# Patient Record
Sex: FEMALE | Race: WHITE | Employment: FULL TIME | URBAN - METROPOLITAN AREA
[De-identification: names, ages, dates, MRNs, and addresses within clinical notes are randomized per-mention and may not be internally consistent; named-entity substitution may affect disease eponyms.]

---

## 2023-02-20 ENCOUNTER — HOSPITAL ENCOUNTER (EMERGENCY)
Age: 53
Discharge: HOME | End: 2023-02-20
Attending: STUDENT IN AN ORGANIZED HEALTH CARE EDUCATION/TRAINING PROGRAM
Payer: COMMERCIAL

## 2023-02-20 VITALS
HEART RATE: 88 BPM | SYSTOLIC BLOOD PRESSURE: 175 MMHG | RESPIRATION RATE: 18 BRPM | OXYGEN SATURATION: 100 % | TEMPERATURE: 97.4 F | DIASTOLIC BLOOD PRESSURE: 94 MMHG

## 2023-02-20 DIAGNOSIS — T78.40XA ALLERGIC REACTION, INITIAL ENCOUNTER: Primary | ICD-10-CM

## 2023-02-20 PROCEDURE — A4216 STERILE WATER/SALINE, 10 ML: HCPCS | Performed by: STUDENT IN AN ORGANIZED HEALTH CARE EDUCATION/TRAINING PROGRAM

## 2023-02-20 PROCEDURE — 99284 EMERGENCY DEPT VISIT MOD MDM: CPT

## 2023-02-20 PROCEDURE — 96374 THER/PROPH/DIAG INJ IV PUSH: CPT

## 2023-02-20 PROCEDURE — 2500000003 HC RX 250 WO HCPCS: Performed by: STUDENT IN AN ORGANIZED HEALTH CARE EDUCATION/TRAINING PROGRAM

## 2023-02-20 PROCEDURE — 2580000003 HC RX 258: Performed by: STUDENT IN AN ORGANIZED HEALTH CARE EDUCATION/TRAINING PROGRAM

## 2023-02-20 PROCEDURE — 96375 TX/PRO/DX INJ NEW DRUG ADDON: CPT

## 2023-02-20 PROCEDURE — 96361 HYDRATE IV INFUSION ADD-ON: CPT

## 2023-02-20 PROCEDURE — 6360000002 HC RX W HCPCS: Performed by: STUDENT IN AN ORGANIZED HEALTH CARE EDUCATION/TRAINING PROGRAM

## 2023-02-20 RX ORDER — METHYLPREDNISOLONE SODIUM SUCCINATE 125 MG/2ML
125 INJECTION, POWDER, LYOPHILIZED, FOR SOLUTION INTRAMUSCULAR; INTRAVENOUS ONCE
Status: COMPLETED | OUTPATIENT
Start: 2023-02-20 | End: 2023-02-20

## 2023-02-20 RX ORDER — EPINEPHRINE 0.3 MG/.3ML
0.3 INJECTION SUBCUTANEOUS PRN
Qty: 0.3 ML | Refills: 0 | Status: SHIPPED | OUTPATIENT
Start: 2023-02-20

## 2023-02-20 RX ORDER — FAMOTIDINE 20 MG/1
20 TABLET, FILM COATED ORAL ONCE
Status: DISCONTINUED | OUTPATIENT
Start: 2023-02-20 | End: 2023-02-20

## 2023-02-20 RX ORDER — PREDNISONE 20 MG/1
20 TABLET ORAL 2 TIMES DAILY
Qty: 10 TABLET | Refills: 0 | Status: SHIPPED | OUTPATIENT
Start: 2023-02-20 | End: 2023-02-25

## 2023-02-20 RX ORDER — AZITHROMYCIN 250 MG/1
TABLET, FILM COATED ORAL
Qty: 1 PACKET | Refills: 0 | Status: SHIPPED | OUTPATIENT
Start: 2023-02-20 | End: 2023-02-24

## 2023-02-20 RX ORDER — FAMOTIDINE 20 MG/1
20 TABLET, FILM COATED ORAL DAILY
Qty: 14 TABLET | Refills: 0 | Status: SHIPPED | OUTPATIENT
Start: 2023-02-20 | End: 2023-03-06

## 2023-02-20 RX ORDER — DIPHENHYDRAMINE HYDROCHLORIDE 50 MG/ML
25 INJECTION INTRAMUSCULAR; INTRAVENOUS ONCE
Status: COMPLETED | OUTPATIENT
Start: 2023-02-20 | End: 2023-02-20

## 2023-02-20 RX ORDER — 0.9 % SODIUM CHLORIDE 0.9 %
1000 INTRAVENOUS SOLUTION INTRAVENOUS ONCE
Status: COMPLETED | OUTPATIENT
Start: 2023-02-20 | End: 2023-02-20

## 2023-02-20 RX ADMIN — METHYLPREDNISOLONE SODIUM SUCCINATE 125 MG: 125 INJECTION, POWDER, FOR SOLUTION INTRAMUSCULAR; INTRAVENOUS at 10:19

## 2023-02-20 RX ADMIN — SODIUM CHLORIDE 1000 ML: 9 INJECTION, SOLUTION INTRAVENOUS at 10:18

## 2023-02-20 RX ADMIN — FAMOTIDINE 20 MG: 10 INJECTION, SOLUTION INTRAVENOUS at 10:23

## 2023-02-20 RX ADMIN — DIPHENHYDRAMINE HYDROCHLORIDE 25 MG: 50 INJECTION, SOLUTION INTRAMUSCULAR; INTRAVENOUS at 10:19

## 2023-02-22 NOTE — ED PROVIDER NOTES
Jody Pang is a 80-year-old female presented to emergency department with allergic reaction. Patient had URI symptoms for several days and she took a Bactrim that she had leftover from a previous prescription. Patient has taken Bactrim once previously and denies having any allergic reaction. Patient began to have hives and diffuse pruritus that started about an hour after taking medication. Patient did take about 50 mg of Benadryl around 9 AM and presented to emergency department for evaluation. Patient denies fever, chills, chest pain, shortness of breath. The history is provided by the patient and medical records. Review of Systems   Constitutional:  Negative for chills, diaphoresis, fatigue and fever. Eyes:  Negative for photophobia and visual disturbance. Respiratory:  Negative for cough, chest tightness and shortness of breath. Cardiovascular:  Negative for chest pain, palpitations and leg swelling. Gastrointestinal:  Negative for abdominal distention, abdominal pain, diarrhea, nausea and vomiting. Genitourinary:  Negative for dysuria. Musculoskeletal:  Negative for neck pain and neck stiffness. Skin:  Positive for rash. Negative for pallor. Neurological:  Negative for headaches. Psychiatric/Behavioral:  Negative for confusion. Physical Exam  Vitals and nursing note reviewed. Constitutional:       General: She is not in acute distress. Appearance: Normal appearance. She is not ill-appearing. HENT:      Head: Normocephalic and atraumatic. Eyes:      General: No scleral icterus. Conjunctiva/sclera: Conjunctivae normal.      Pupils: Pupils are equal, round, and reactive to light. Cardiovascular:      Rate and Rhythm: Normal rate and regular rhythm. Pulmonary:      Effort: Pulmonary effort is normal.      Breath sounds: Normal breath sounds. Abdominal:      General: Bowel sounds are normal. There is no distension. Palpations: Abdomen is soft. Tenderness: There is no abdominal tenderness. There is no guarding or rebound. Musculoskeletal:      Cervical back: Normal range of motion and neck supple. No rigidity. No muscular tenderness. Right lower leg: No edema. Left lower leg: No edema. Skin:     General: Skin is warm and dry. Capillary Refill: Capillary refill takes less than 2 seconds. Coloration: Skin is not pale. Findings: Rash present. No erythema. Neurological:      Mental Status: She is alert and oriented to person, place, and time. Psychiatric:         Mood and Affect: Mood normal.        Procedures     MDM  Number of Diagnoses or Management Options  Allergic reaction, initial encounter  Diagnosis management comments: Lamont Chi is a 54-year-old female presented to emergency department with concern for allergic reaction. Patient developed symptoms of hives and pruritus following taking a Bactrim. Patient did use a topical facial cream also this morning but stated that she has used that previously. Patient did have hives that were diffuse covering her face back chest and arms. Did jeovany. Patient initially tachycardic  She was given IV fluids, IV Benadryl, IV Pepcid, IV solumedrol. Patient did not appear to have airway or GI involvement  Was observed in emergency department and home with resolution of symptoms. Patient was well-appearing at repeat evaluation not in acute distress  Patient was discharged home with steroids, Benadryl, EpiPen. Patient was advised to take Benadryl scheduled for the first 24 hours  She was advised return ED if she has any worsening of symptoms patient was advised on when to use EpiPen if needed. Patient was advised to avoid Bactrim. Patient has had prolonged symptoms of URI symptoms patient does not wish for repeat COVID or flu test patient did take a home COVID test which was negative patient will be given a prescription for azithromycin.   With patient's symptoms resolving patient does not likely need further evaluation emergency department patient's tachycardia resolved after IV fluids and medication patient is well-appearing at time of reevaluation she is agreeable to plan and given follow-up information  Lab work was considered but not ordered as patient appeared to have symptoms due to allergic reaction and had resolution of symptoms did not require any further evaluation while in ED, was not having any chest pain or shortness of breath    Differential diagnosis includes but is not limited to, allergic reaction, contact dermatitis, atopic dermatitis, cellulitis, autoimmune reaction              --------------------------------------------- PAST HISTORY ---------------------------------------------  Past Medical History:  has no past medical history on file. Past Surgical History:  has no past surgical history on file. Social History:      Family History: family history is not on file. The patients home medications have been reviewed. Allergies: Bactrim [sulfamethoxazole-trimethoprim]    -------------------------------------------------- RESULTS -------------------------------------------------  Labs:  No results found for this visit on 02/20/23. Radiology:  No orders to display       ------------------------- NURSING NOTES AND VITALS REVIEWED ---------------------------  Date / Time Roomed:  2/20/2023  9:53 AM  ED Bed Assignment:  13/13    The nursing notes within the ED encounter and vital signs as below have been reviewed. BP (!) 175/94   Pulse 88   Temp 97.4 °F (36.3 °C) (Infrared)   Resp 18   SpO2 100%   Oxygen Saturation Interpretation: Normal      ------------------------------------------ PROGRESS NOTES ------------------------------------------  7:10 AM EST  I have spoken with the patient and discussed todays results, in addition to providing specific details for the plan of care and counseling regarding the diagnosis and prognosis.   Their questions are answered at this time and they are agreeable with the plan. I discussed at length with them reasons for immediate return here for re evaluation. They will followup with their primary care physician by calling their office tomorrow. --------------------------------- ADDITIONAL PROVIDER NOTES ---------------------------------  At this time the patient is without objective evidence of an acute process requiring hospitalization or inpatient management. They have remained hemodynamically stable throughout their entire ED visit and are stable for discharge with outpatient follow-up. The plan has been discussed in detail and they are aware of the specific conditions for emergent return, as well as the importance of follow-up. Discharge Medication List as of 2/20/2023 12:59 PM        START taking these medications    Details   predniSONE (DELTASONE) 20 MG tablet Take 1 tablet by mouth 2 times daily for 5 days, Disp-10 tablet, R-0Print      EPINEPHrine (EPIPEN 2-ASHLEY) 0.3 MG/0.3ML SOAJ injection Inject 0.3 mLs into the muscle as needed (anaphylaxis, difficulty breathing) Use as directed for allergic reaction, Disp-0.3 mL, R-0Print      famotidine (PEPCID) 20 MG tablet Take 1 tablet by mouth daily for 14 days, Disp-14 tablet, R-0Print      azithromycin (ZITHROMAX Z-ASHLEY) 250 MG tablet Take 2 tablets (500 mg) on Day 1, and then take 1 tablet (250 mg) on days 2 through 5., Disp-1 packet, R-0Print             Diagnosis:  1. Allergic reaction, initial encounter        Disposition:  Patient's disposition: Discharge to home  Patient's condition is stable.              Paulo Chicas MD  02/23/23 [id number]

## 2023-02-23 ASSESSMENT — ENCOUNTER SYMPTOMS
VOMITING: 0
NAUSEA: 0
ABDOMINAL PAIN: 0
SHORTNESS OF BREATH: 0
DIARRHEA: 0
COUGH: 0
ABDOMINAL DISTENTION: 0
CHEST TIGHTNESS: 0
PHOTOPHOBIA: 0

## 2023-05-09 LAB — TISSUE TRANSGLUTAMINASE, IGA: 26 U/ML (ref 0–14)

## 2023-05-17 LAB
CALPROTECTIN, STOOL: 49 UG/G
PANCREATIC ELASTASE, FECAL: 630 UG/G

## 2023-05-24 ENCOUNTER — OFFICE VISIT (OUTPATIENT)
Dept: PRIMARY CARE | Facility: CLINIC | Age: 53
End: 2023-05-24
Payer: COMMERCIAL

## 2023-05-24 VITALS
SYSTOLIC BLOOD PRESSURE: 134 MMHG | OXYGEN SATURATION: 95 % | DIASTOLIC BLOOD PRESSURE: 80 MMHG | WEIGHT: 176 LBS | RESPIRATION RATE: 16 BRPM | HEART RATE: 70 BPM | BODY MASS INDEX: 31.18 KG/M2 | TEMPERATURE: 98.7 F

## 2023-05-24 DIAGNOSIS — Z11.59 NEED FOR HEPATITIS C SCREENING TEST: ICD-10-CM

## 2023-05-24 DIAGNOSIS — R13.10 DYSPHAGIA, UNSPECIFIED TYPE: ICD-10-CM

## 2023-05-24 DIAGNOSIS — Z11.4 ENCOUNTER FOR SCREENING FOR HIV: ICD-10-CM

## 2023-05-24 DIAGNOSIS — E03.9 ADULT HYPOTHYROIDISM: ICD-10-CM

## 2023-05-24 DIAGNOSIS — I10 ESSENTIAL HYPERTENSION: ICD-10-CM

## 2023-05-24 DIAGNOSIS — F17.211 CIGARETTE NICOTINE DEPENDENCE IN REMISSION: ICD-10-CM

## 2023-05-24 DIAGNOSIS — R89.4 ABNORMAL CELIAC ANTIBODY PANEL: ICD-10-CM

## 2023-05-24 DIAGNOSIS — E66.09 CLASS 1 OBESITY DUE TO EXCESS CALORIES WITHOUT SERIOUS COMORBIDITY WITH BODY MASS INDEX (BMI) OF 31.0 TO 31.9 IN ADULT: ICD-10-CM

## 2023-05-24 DIAGNOSIS — R19.7 DIARRHEA, UNSPECIFIED TYPE: ICD-10-CM

## 2023-05-24 DIAGNOSIS — K21.9 GASTROESOPHAGEAL REFLUX DISEASE WITHOUT ESOPHAGITIS: Primary | ICD-10-CM

## 2023-05-24 DIAGNOSIS — J30.2 SEASONAL ALLERGIC RHINITIS, UNSPECIFIED TRIGGER: ICD-10-CM

## 2023-05-24 DIAGNOSIS — R91.1 LUNG NODULE: ICD-10-CM

## 2023-05-24 DIAGNOSIS — Z13.220 LIPID SCREENING: ICD-10-CM

## 2023-05-24 PROBLEM — I34.1 MVP (MITRAL VALVE PROLAPSE): Status: ACTIVE | Noted: 2023-05-24

## 2023-05-24 PROBLEM — R92.8 ABNORMAL MAMMOGRAM: Status: RESOLVED | Noted: 2023-05-24 | Resolved: 2023-05-24

## 2023-05-24 PROBLEM — F41.1 GENERALIZED ANXIETY DISORDER: Status: ACTIVE | Noted: 2023-05-24

## 2023-05-24 PROBLEM — N39.3 URINARY, INCONTINENCE, STRESS FEMALE: Status: ACTIVE | Noted: 2023-05-24

## 2023-05-24 PROBLEM — Z78.9 CAFFEINE USE: Status: ACTIVE | Noted: 2023-05-24

## 2023-05-24 PROBLEM — Z88.9 MULTIPLE DRUG ALLERGIES: Status: ACTIVE | Noted: 2023-05-24

## 2023-05-24 PROBLEM — R35.0 URINARY FREQUENCY: Status: RESOLVED | Noted: 2023-05-24 | Resolved: 2023-05-24

## 2023-05-24 PROBLEM — A08.0 ROTAVIRUS INFECTION: Status: ACTIVE | Noted: 2023-05-24

## 2023-05-24 PROBLEM — E66.811 CLASS 1 OBESITY DUE TO EXCESS CALORIES WITHOUT SERIOUS COMORBIDITY WITH BODY MASS INDEX (BMI) OF 31.0 TO 31.9 IN ADULT: Status: ACTIVE | Noted: 2023-05-24

## 2023-05-24 PROCEDURE — 99203 OFFICE O/P NEW LOW 30 MIN: CPT | Performed by: FAMILY MEDICINE

## 2023-05-24 PROCEDURE — 3008F BODY MASS INDEX DOCD: CPT | Performed by: FAMILY MEDICINE

## 2023-05-24 PROCEDURE — 3075F SYST BP GE 130 - 139MM HG: CPT | Performed by: FAMILY MEDICINE

## 2023-05-24 PROCEDURE — 1036F TOBACCO NON-USER: CPT | Performed by: FAMILY MEDICINE

## 2023-05-24 PROCEDURE — 3079F DIAST BP 80-89 MM HG: CPT | Performed by: FAMILY MEDICINE

## 2023-05-24 RX ORDER — FEXOFENADINE HCL AND PSEUDOEPHEDRINE HCI 60; 120 MG/1; MG/1
1 TABLET, EXTENDED RELEASE ORAL 2 TIMES DAILY
COMMUNITY
End: 2023-05-24 | Stop reason: DRUGHIGH

## 2023-05-24 RX ORDER — FEXOFENADINE HCL AND PSEUDOEPHEDRINE HCI 60; 120 MG/1; MG/1
1 TABLET, EXTENDED RELEASE ORAL DAILY
Status: SHIPPED
Start: 2023-05-24 | End: 2023-09-05 | Stop reason: SDUPTHER

## 2023-05-24 RX ORDER — LEVOTHYROXINE SODIUM 100 UG/1
100 TABLET ORAL DAILY
COMMUNITY
Start: 2020-04-02 | End: 2023-05-24 | Stop reason: SDUPTHER

## 2023-05-24 RX ORDER — LEVOTHYROXINE SODIUM 100 UG/1
100 TABLET ORAL DAILY
Qty: 90 TABLET | Refills: 1
Start: 2023-05-24 | End: 2023-11-20

## 2023-05-24 RX ORDER — OMEPRAZOLE 40 MG/1
CAPSULE, DELAYED RELEASE ORAL
COMMUNITY
Start: 2021-10-11 | End: 2023-05-24 | Stop reason: SDUPTHER

## 2023-05-24 RX ORDER — OMEPRAZOLE 40 MG/1
40 CAPSULE, DELAYED RELEASE ORAL
Qty: 90 CAPSULE | Refills: 1 | Status: SHIPPED | OUTPATIENT
Start: 2023-05-24 | End: 2023-06-19 | Stop reason: SDUPTHER

## 2023-05-24 RX ORDER — EPINEPHRINE 0.3 MG/.3ML
0.3 INJECTION SUBCUTANEOUS
COMMUNITY
Start: 2023-02-20

## 2023-05-24 ASSESSMENT — ENCOUNTER SYMPTOMS
PALPITATIONS: 0
ADENOPATHY: 0
VOMITING: 0
DIAPHORESIS: 0
NUMBNESS: 0
LIGHT-HEADEDNESS: 0
CHILLS: 0
FREQUENCY: 0
POLYDIPSIA: 0
NAUSEA: 0
SORE THROAT: 0
ABDOMINAL PAIN: 1
DYSPHORIC MOOD: 0
WHEEZING: 0
CONSTIPATION: 0
SINUS PAIN: 0
DYSURIA: 0
COUGH: 0
CONFUSION: 0
DIZZINESS: 0
CHEST TIGHTNESS: 0
HEMATURIA: 0
SHORTNESS OF BREATH: 0
HEADACHES: 0
SINUS PRESSURE: 0
DIARRHEA: 1
UNEXPECTED WEIGHT CHANGE: 0
POLYPHAGIA: 0
NERVOUS/ANXIOUS: 0
FEVER: 0

## 2023-05-24 ASSESSMENT — PATIENT HEALTH QUESTIONNAIRE - PHQ9
SUM OF ALL RESPONSES TO PHQ9 QUESTIONS 1 AND 2: 0
1. LITTLE INTEREST OR PLEASURE IN DOING THINGS: NOT AT ALL
2. FEELING DOWN, DEPRESSED OR HOPELESS: NOT AT ALL

## 2023-05-24 NOTE — ASSESSMENT & PLAN NOTE
-  4 mm ground glass nodule in right lung  - per Fleischner  criteria, no routine follow up needed  - will check LDCT in 9/2023 as she does have smoking history

## 2023-05-24 NOTE — PROGRESS NOTES
Subjective   Patient ID: Kalie Galdamez is a 52 y.o. female who presents for new to estab (Sees GI will have a scope and colonoscopy, gets bronchitis a lot, still sounds very congested, stomach issues started in March, ended up in ER, positive for rotavirus, had another spell last week, siliac is high).    52 y.o. female here to establish care. Past medical history, past surgical history, medications, allergies, family history, and social history reviewed with patient and updated in chart.     Has been having a lot of GI troubles since the beginning of this year --> epigastric pain, diarreha. Did have rotavirus but these symptoms have been going on for a few months. Is seeing GI. Had blood work with positive celiac antibodies. Will be having EGD and colonoscopy.     She has chronic allergy symptoms. Uses Allegra-D and that does help. Has been on plain allegra and does not really help with her sinus congestion.          Review of Systems   Constitutional:  Negative for chills, diaphoresis, fever and unexpected weight change.   HENT:  Negative for congestion, sinus pressure, sinus pain, sneezing and sore throat.    Respiratory:  Negative for cough, chest tightness, shortness of breath and wheezing.    Cardiovascular:  Negative for chest pain, palpitations and leg swelling.   Gastrointestinal:  Positive for abdominal pain and diarrhea. Negative for constipation, nausea and vomiting.   Endocrine: Negative for cold intolerance, heat intolerance, polydipsia, polyphagia and polyuria.   Genitourinary:  Negative for dysuria, frequency, hematuria and urgency.   Neurological:  Negative for dizziness, syncope, light-headedness, numbness and headaches.   Hematological:  Negative for adenopathy.   Psychiatric/Behavioral:  Negative for confusion and dysphoric mood. The patient is not nervous/anxious.        Objective   /80   Pulse 70   Temp 37.1 °C (98.7 °F)   Resp 16   Wt 79.8 kg (176 lb)   SpO2 95%   BMI 31.18  kg/m²     Physical Exam  Vitals and nursing note reviewed.   Constitutional:       General: She is not in acute distress.     Appearance: Normal appearance.   HENT:      Head: Normocephalic and atraumatic.      Nose: Nose normal.   Eyes:      Extraocular Movements: Extraocular movements intact.      Conjunctiva/sclera: Conjunctivae normal.      Pupils: Pupils are equal, round, and reactive to light.   Cardiovascular:      Rate and Rhythm: Normal rate and regular rhythm.      Heart sounds: No murmur heard.     No friction rub. No gallop.   Pulmonary:      Effort: Pulmonary effort is normal.      Breath sounds: Normal breath sounds. No wheezing, rhonchi or rales.   Abdominal:      General: Bowel sounds are normal. There is no distension.      Palpations: Abdomen is soft.      Tenderness: There is no abdominal tenderness.   Musculoskeletal:         General: Normal range of motion.      Cervical back: Normal range of motion and neck supple.   Skin:     General: Skin is warm and dry.   Neurological:      General: No focal deficit present.      Mental Status: She is alert and oriented to person, place, and time.      Deep Tendon Reflexes: Reflexes normal.   Psychiatric:         Mood and Affect: Mood normal.         Behavior: Behavior normal.         Thought Content: Thought content normal.         Judgment: Judgment normal.         Assessment/Plan   Problem List Items Addressed This Visit       Abnormal celiac antibody panel     - following with GI  - will be going for EGD and colonoscopy         Relevant Orders    CBC and Auto Differential    Comprehensive Metabolic Panel    Adult hypothyroidism     - continue synthroid  - check labs           Relevant Medications    levothyroxine (Synthroid) 100 mcg tablet    Other Relevant Orders    CBC and Auto Differential    Comprehensive Metabolic Panel    TSH with reflex to Free T4 if abnormal    Allergic rhinitis, seasonal     - currently on allegra-D         Relevant Orders     CBC and Auto Differential    Comprehensive Metabolic Panel    BMI 31.0-31.9,adult     - Encouraged healthy lifestyle, including adequate exercise and high fiber, low fat and low carb diet.          Relevant Orders    CBC and Auto Differential    Comprehensive Metabolic Panel    Cigarette nicotine dependence in remission     - check LDCT in 9/2023         Relevant Orders    CBC and Auto Differential    Comprehensive Metabolic Panel    CT lung screening low dose    Class 1 obesity due to excess calories without serious comorbidity with body mass index (BMI) of 31.0 to 31.9 in adult     - Encouraged healthy lifestyle, including adequate exercise and high fiber, low fat and low carb diet.          Relevant Orders    CBC and Auto Differential    Comprehensive Metabolic Panel    Diarrhea     - following with GI. Will be going for EGD and colonoscopy         Relevant Orders    CBC and Auto Differential    Comprehensive Metabolic Panel    Dysphagia     - following with GI  - will be going for EGD         Relevant Orders    CBC and Auto Differential    Comprehensive Metabolic Panel    Essential hypertension     - not on medication   - blood pressure on the high side initially but improved on recheck  - is on allegra-D, which may be contributing  - will have her monitor blood pressure at home and call with results in 2 weeks. If blood pressure remains high, will need to try off allegra-D         Relevant Orders    CBC and Auto Differential    Comprehensive Metabolic Panel    Gastroesophageal reflux disease without esophagitis - Primary     - controlled on omeprazole          Relevant Medications    omeprazole (PriLOSEC) 40 mg DR capsule    Other Relevant Orders    CBC and Auto Differential    Comprehensive Metabolic Panel    Lung nodule     -  4 mm ground glass nodule in right lung  - per Fleischner  criteria, no routine follow up needed  - will check LDCT in 9/2023 as she does have smoking history          Relevant Orders     CBC and Auto Differential    Comprehensive Metabolic Panel     Other Visit Diagnoses       Need for hepatitis C screening test        Relevant Orders    Hepatitis C Antibody    Encounter for screening for HIV        Relevant Orders    HIV 1/2 Antigen/Antibody Screen with Reflex to Confirmation    Lipid screening        Relevant Orders    Lipid Panel

## 2023-05-24 NOTE — PATIENT INSTRUCTIONS
Kalie Galdamez ,    Thank you for coming in today. We at [facility] appreciate your trust in our care. If you have any questions or concerns about the care you received today, please do not hesitate to contact us at [phone].    The following instructions were discussed today:    - for now, stay on allegra-D  - monitor blood pressure at home once daily. As long as blood pressure stays under 140/90, okay to stay on allegra-D  - if blood pressure is consistently over 140/90, you should stop the allegra-D and try plain allegra   - get labs  - For blood work: Nothing to eat or drink for at least 10 hours prior. Okay for water or black coffee.   - Follow up in 3 months.

## 2023-05-24 NOTE — ASSESSMENT & PLAN NOTE
- not on medication   - blood pressure on the high side initially but improved on recheck  - is on allegra-D, which may be contributing  - will have her monitor blood pressure at home and call with results in 2 weeks. If blood pressure remains high, will need to try off allegra-D

## 2023-05-31 ENCOUNTER — HOSPITAL ENCOUNTER (OUTPATIENT)
Dept: DATA CONVERSION | Facility: HOSPITAL | Age: 53
End: 2023-05-31
Attending: INTERNAL MEDICINE | Admitting: INTERNAL MEDICINE
Payer: COMMERCIAL

## 2023-05-31 DIAGNOSIS — R13.10 DYSPHAGIA, UNSPECIFIED: ICD-10-CM

## 2023-05-31 DIAGNOSIS — K64.0 FIRST DEGREE HEMORRHOIDS: ICD-10-CM

## 2023-05-31 DIAGNOSIS — D12.0 BENIGN NEOPLASM OF CECUM: ICD-10-CM

## 2023-05-31 DIAGNOSIS — E78.00 PURE HYPERCHOLESTEROLEMIA, UNSPECIFIED: ICD-10-CM

## 2023-05-31 DIAGNOSIS — K52.9 NONINFECTIVE GASTROENTERITIS AND COLITIS, UNSPECIFIED: ICD-10-CM

## 2023-05-31 DIAGNOSIS — K21.9 GASTRO-ESOPHAGEAL REFLUX DISEASE WITHOUT ESOPHAGITIS: ICD-10-CM

## 2023-05-31 DIAGNOSIS — R10.13 EPIGASTRIC PAIN: ICD-10-CM

## 2023-05-31 DIAGNOSIS — R13.10 DYSPHAGIA: ICD-10-CM

## 2023-05-31 DIAGNOSIS — Z87.891 PERSONAL HISTORY OF NICOTINE DEPENDENCE: ICD-10-CM

## 2023-05-31 DIAGNOSIS — D12.2 BENIGN NEOPLASM OF ASCENDING COLON: ICD-10-CM

## 2023-05-31 DIAGNOSIS — I10 ESSENTIAL (PRIMARY) HYPERTENSION: ICD-10-CM

## 2023-05-31 DIAGNOSIS — K29.70 GASTRITIS, UNSPECIFIED, WITHOUT BLEEDING: ICD-10-CM

## 2023-06-08 LAB
COMPLETE PATHOLOGY REPORT: NORMAL
CONVERTED CLINICAL DIAGNOSIS-HISTORY: NORMAL
CONVERTED FINAL DIAGNOSIS: NORMAL
CONVERTED FINAL REPORT PDF LINK TO COPY AND PASTE: NORMAL
CONVERTED GROSS DESCRIPTION: NORMAL

## 2023-06-14 ENCOUNTER — LAB (OUTPATIENT)
Dept: LAB | Facility: LAB | Age: 53
End: 2023-06-14
Payer: COMMERCIAL

## 2023-06-14 DIAGNOSIS — R89.4 ABNORMAL CELIAC ANTIBODY PANEL: ICD-10-CM

## 2023-06-14 DIAGNOSIS — F17.211 CIGARETTE NICOTINE DEPENDENCE IN REMISSION: ICD-10-CM

## 2023-06-14 DIAGNOSIS — K21.9 GASTROESOPHAGEAL REFLUX DISEASE WITHOUT ESOPHAGITIS: ICD-10-CM

## 2023-06-14 DIAGNOSIS — R91.1 LUNG NODULE: ICD-10-CM

## 2023-06-14 DIAGNOSIS — R19.7 DIARRHEA, UNSPECIFIED TYPE: ICD-10-CM

## 2023-06-14 DIAGNOSIS — Z11.59 NEED FOR HEPATITIS C SCREENING TEST: ICD-10-CM

## 2023-06-14 DIAGNOSIS — J30.2 SEASONAL ALLERGIC RHINITIS, UNSPECIFIED TRIGGER: ICD-10-CM

## 2023-06-14 DIAGNOSIS — I10 ESSENTIAL HYPERTENSION: ICD-10-CM

## 2023-06-14 DIAGNOSIS — E03.9 ADULT HYPOTHYROIDISM: ICD-10-CM

## 2023-06-14 DIAGNOSIS — E66.09 CLASS 1 OBESITY DUE TO EXCESS CALORIES WITHOUT SERIOUS COMORBIDITY WITH BODY MASS INDEX (BMI) OF 31.0 TO 31.9 IN ADULT: ICD-10-CM

## 2023-06-14 DIAGNOSIS — R13.10 DYSPHAGIA, UNSPECIFIED TYPE: ICD-10-CM

## 2023-06-14 DIAGNOSIS — Z13.220 LIPID SCREENING: ICD-10-CM

## 2023-06-14 DIAGNOSIS — Z11.4 ENCOUNTER FOR SCREENING FOR HIV: ICD-10-CM

## 2023-06-14 LAB
ALANINE AMINOTRANSFERASE (SGPT) (U/L) IN SER/PLAS: 18 U/L (ref 7–45)
ALBUMIN (G/DL) IN SER/PLAS: 4.2 G/DL (ref 3.4–5)
ALKALINE PHOSPHATASE (U/L) IN SER/PLAS: 33 U/L (ref 33–110)
ANION GAP IN SER/PLAS: 11 MMOL/L (ref 10–20)
ASPARTATE AMINOTRANSFERASE (SGOT) (U/L) IN SER/PLAS: 17 U/L (ref 9–39)
BASOPHILS (10*3/UL) IN BLOOD BY AUTOMATED COUNT: 0.06 X10E9/L (ref 0–0.1)
BASOPHILS/100 LEUKOCYTES IN BLOOD BY AUTOMATED COUNT: 0.8 % (ref 0–2)
BILIRUBIN TOTAL (MG/DL) IN SER/PLAS: 0.4 MG/DL (ref 0–1.2)
CALCIUM (MG/DL) IN SER/PLAS: 9.6 MG/DL (ref 8.6–10.3)
CARBON DIOXIDE, TOTAL (MMOL/L) IN SER/PLAS: 30 MMOL/L (ref 21–32)
CHLORIDE (MMOL/L) IN SER/PLAS: 103 MMOL/L (ref 98–107)
CHOLESTEROL (MG/DL) IN SER/PLAS: 199 MG/DL (ref 0–199)
CHOLESTEROL IN HDL (MG/DL) IN SER/PLAS: 60.8 MG/DL
CHOLESTEROL/HDL RATIO: 3.3
CREATININE (MG/DL) IN SER/PLAS: 0.79 MG/DL (ref 0.5–1.05)
EOSINOPHILS (10*3/UL) IN BLOOD BY AUTOMATED COUNT: 0.21 X10E9/L (ref 0–0.7)
EOSINOPHILS/100 LEUKOCYTES IN BLOOD BY AUTOMATED COUNT: 2.9 % (ref 0–6)
ERYTHROCYTE DISTRIBUTION WIDTH (RATIO) BY AUTOMATED COUNT: 13.2 % (ref 11.5–14.5)
ERYTHROCYTE MEAN CORPUSCULAR HEMOGLOBIN CONCENTRATION (G/DL) BY AUTOMATED: 31.9 G/DL (ref 32–36)
ERYTHROCYTE MEAN CORPUSCULAR VOLUME (FL) BY AUTOMATED COUNT: 89 FL (ref 80–100)
ERYTHROCYTES (10*6/UL) IN BLOOD BY AUTOMATED COUNT: 4.82 X10E12/L (ref 4–5.2)
GFR FEMALE: 90 ML/MIN/1.73M2
GLUCOSE (MG/DL) IN SER/PLAS: 82 MG/DL (ref 74–99)
HEMATOCRIT (%) IN BLOOD BY AUTOMATED COUNT: 43 % (ref 36–46)
HEMOGLOBIN (G/DL) IN BLOOD: 13.7 G/DL (ref 12–16)
HEPATITIS C VIRUS AB PRESENCE IN SERUM: NONREACTIVE
HIV 1/ 2 AG/AB SCREEN: NONREACTIVE
IMMATURE GRANULOCYTES/100 LEUKOCYTES IN BLOOD BY AUTOMATED COUNT: 0.3 % (ref 0–0.9)
LDL: 119 MG/DL (ref 0–99)
LEUKOCYTES (10*3/UL) IN BLOOD BY AUTOMATED COUNT: 7.1 X10E9/L (ref 4.4–11.3)
LYMPHOCYTES (10*3/UL) IN BLOOD BY AUTOMATED COUNT: 3.33 X10E9/L (ref 1.2–4.8)
LYMPHOCYTES/100 LEUKOCYTES IN BLOOD BY AUTOMATED COUNT: 46.8 % (ref 13–44)
MONOCYTES (10*3/UL) IN BLOOD BY AUTOMATED COUNT: 0.5 X10E9/L (ref 0.1–1)
MONOCYTES/100 LEUKOCYTES IN BLOOD BY AUTOMATED COUNT: 7 % (ref 2–10)
NEUTROPHILS (10*3/UL) IN BLOOD BY AUTOMATED COUNT: 3 X10E9/L (ref 1.2–7.7)
NEUTROPHILS/100 LEUKOCYTES IN BLOOD BY AUTOMATED COUNT: 42.2 % (ref 40–80)
PLATELETS (10*3/UL) IN BLOOD AUTOMATED COUNT: 335 X10E9/L (ref 150–450)
POTASSIUM (MMOL/L) IN SER/PLAS: 4.3 MMOL/L (ref 3.5–5.3)
PROTEIN TOTAL: 7.2 G/DL (ref 6.4–8.2)
SODIUM (MMOL/L) IN SER/PLAS: 140 MMOL/L (ref 136–145)
THYROTROPIN (MIU/L) IN SER/PLAS BY DETECTION LIMIT <= 0.05 MIU/L: 1.5 MIU/L (ref 0.44–3.98)
TRIGLYCERIDE (MG/DL) IN SER/PLAS: 96 MG/DL (ref 0–149)
UREA NITROGEN (MG/DL) IN SER/PLAS: 16 MG/DL (ref 6–23)
VLDL: 19 MG/DL (ref 0–40)

## 2023-06-14 PROCEDURE — 86803 HEPATITIS C AB TEST: CPT

## 2023-06-14 PROCEDURE — 87389 HIV-1 AG W/HIV-1&-2 AB AG IA: CPT

## 2023-06-14 PROCEDURE — 84443 ASSAY THYROID STIM HORMONE: CPT

## 2023-06-14 PROCEDURE — 85025 COMPLETE CBC W/AUTO DIFF WBC: CPT

## 2023-06-14 PROCEDURE — 80053 COMPREHEN METABOLIC PANEL: CPT

## 2023-06-14 PROCEDURE — 80061 LIPID PANEL: CPT

## 2023-06-14 PROCEDURE — 36415 COLL VENOUS BLD VENIPUNCTURE: CPT

## 2023-06-19 ENCOUNTER — TELEPHONE (OUTPATIENT)
Dept: PRIMARY CARE | Facility: CLINIC | Age: 53
End: 2023-06-19
Payer: COMMERCIAL

## 2023-06-19 DIAGNOSIS — K21.9 GASTROESOPHAGEAL REFLUX DISEASE WITHOUT ESOPHAGITIS: ICD-10-CM

## 2023-06-19 NOTE — TELEPHONE ENCOUNTER
Asking for lab results, also rx was to be sent in for prilosec but [facility] (pended)

## 2023-06-20 RX ORDER — OMEPRAZOLE 40 MG/1
40 CAPSULE, DELAYED RELEASE ORAL
Qty: 90 CAPSULE | Refills: 1 | Status: SHIPPED | OUTPATIENT
Start: 2023-06-20 | End: 2023-09-05 | Stop reason: WASHOUT

## 2023-06-21 LAB
DEAMIDATED GLIADIN PEPTIDE IGA: 27 U/ML (ref 0–14)
DEAMIDATED GLIADIN PEPTIDE IGG: 1 U/ML (ref 0–14)
TISSUE TRANSGLUTAMINASE IGG: <1 U/ML (ref 0–14)
TISSUE TRANSGLUTAMINASE, IGA: 16 U/ML (ref 0–14)

## 2023-06-28 LAB
HLA-DQB1*02:01: POSITIVE
HLA-DQB1*02:02: NEGATIVE
HLA-DQB1*03:02(DQ8): NEGATIVE

## 2023-08-02 ENCOUNTER — TELEPHONE (OUTPATIENT)
Dept: PRIMARY CARE | Facility: CLINIC | Age: 53
End: 2023-08-02
Payer: COMMERCIAL

## 2023-08-02 DIAGNOSIS — R07.81 PAIN IN RIB: Primary | ICD-10-CM

## 2023-08-02 NOTE — TELEPHONE ENCOUNTER
PT LEFT MESSAGE THAT SHE THINKS SHE BROKE HER RIBS LAST MONDAY. SHE IS STARTING TO FEEL BETTER BUT ASKING IF YOU WOULD GIVE ORDER FOR XRAY. SHE DOES NOT WANT TO GO TO URGENT CARE.

## 2023-09-05 ENCOUNTER — OFFICE VISIT (OUTPATIENT)
Dept: PRIMARY CARE | Facility: CLINIC | Age: 53
End: 2023-09-05
Payer: COMMERCIAL

## 2023-09-05 VITALS
HEART RATE: 76 BPM | SYSTOLIC BLOOD PRESSURE: 138 MMHG | DIASTOLIC BLOOD PRESSURE: 80 MMHG | RESPIRATION RATE: 16 BRPM | BODY MASS INDEX: 33.68 KG/M2 | TEMPERATURE: 98 F | OXYGEN SATURATION: 96 % | HEIGHT: 62 IN | WEIGHT: 183 LBS

## 2023-09-05 DIAGNOSIS — M79.601 RIGHT ARM PAIN: ICD-10-CM

## 2023-09-05 DIAGNOSIS — F17.211 CIGARETTE NICOTINE DEPENDENCE IN REMISSION: ICD-10-CM

## 2023-09-05 DIAGNOSIS — R91.1 LUNG NODULE: ICD-10-CM

## 2023-09-05 DIAGNOSIS — J30.2 SEASONAL ALLERGIC RHINITIS, UNSPECIFIED TRIGGER: ICD-10-CM

## 2023-09-05 DIAGNOSIS — K21.9 GASTROESOPHAGEAL REFLUX DISEASE WITHOUT ESOPHAGITIS: ICD-10-CM

## 2023-09-05 DIAGNOSIS — Z12.31 VISIT FOR SCREENING MAMMOGRAM: ICD-10-CM

## 2023-09-05 DIAGNOSIS — E56.9 VITAMIN DEFICIENCY: ICD-10-CM

## 2023-09-05 DIAGNOSIS — I10 ESSENTIAL HYPERTENSION: Primary | ICD-10-CM

## 2023-09-05 DIAGNOSIS — E66.09 CLASS 1 OBESITY DUE TO EXCESS CALORIES WITHOUT SERIOUS COMORBIDITY WITH BODY MASS INDEX (BMI) OF 33.0 TO 33.9 IN ADULT: ICD-10-CM

## 2023-09-05 DIAGNOSIS — K90.0 ADULT CELIAC DISEASE (HHS-HCC): ICD-10-CM

## 2023-09-05 DIAGNOSIS — E03.9 ADULT HYPOTHYROIDISM: ICD-10-CM

## 2023-09-05 DIAGNOSIS — Z78.0 MENOPAUSE: ICD-10-CM

## 2023-09-05 DIAGNOSIS — M85.80 OSTEOPENIA, UNSPECIFIED LOCATION: ICD-10-CM

## 2023-09-05 DIAGNOSIS — Z23 ENCOUNTER FOR IMMUNIZATION: ICD-10-CM

## 2023-09-05 PROBLEM — R13.10 DYSPHAGIA: Status: RESOLVED | Noted: 2023-05-24 | Resolved: 2023-09-05

## 2023-09-05 PROBLEM — R19.7 DIARRHEA: Status: RESOLVED | Noted: 2023-05-24 | Resolved: 2023-09-05

## 2023-09-05 PROBLEM — A08.0 ROTAVIRUS INFECTION: Status: RESOLVED | Noted: 2023-05-24 | Resolved: 2023-09-05

## 2023-09-05 PROBLEM — K64.0 FIRST DEGREE HEMORRHOIDS: Status: RESOLVED | Noted: 2023-05-31 | Resolved: 2023-09-05

## 2023-09-05 PROBLEM — R61 GENERALIZED HYPERHIDROSIS: Status: RESOLVED | Noted: 2023-04-29 | Resolved: 2023-09-05

## 2023-09-05 PROBLEM — R89.4 ABNORMAL CELIAC ANTIBODY PANEL: Status: RESOLVED | Noted: 2023-05-24 | Resolved: 2023-09-05

## 2023-09-05 PROCEDURE — 3075F SYST BP GE 130 - 139MM HG: CPT | Performed by: FAMILY MEDICINE

## 2023-09-05 PROCEDURE — 99214 OFFICE O/P EST MOD 30 MIN: CPT | Performed by: FAMILY MEDICINE

## 2023-09-05 PROCEDURE — 1036F TOBACCO NON-USER: CPT | Performed by: FAMILY MEDICINE

## 2023-09-05 PROCEDURE — 3079F DIAST BP 80-89 MM HG: CPT | Performed by: FAMILY MEDICINE

## 2023-09-05 PROCEDURE — 3008F BODY MASS INDEX DOCD: CPT | Performed by: FAMILY MEDICINE

## 2023-09-05 RX ORDER — FLUTICASONE PROPIONATE 50 MCG
1 SPRAY, SUSPENSION (ML) NASAL
COMMUNITY

## 2023-09-05 RX ORDER — MINERAL OIL
180 ENEMA (ML) RECTAL
COMMUNITY
End: 2023-09-05 | Stop reason: WASHOUT

## 2023-09-05 RX ORDER — OMEPRAZOLE 40 MG/1
40 CAPSULE, DELAYED RELEASE ORAL
Qty: 90 CAPSULE | Refills: 1
Start: 2023-09-05

## 2023-09-05 RX ORDER — FAMOTIDINE 20 MG/1
TABLET, FILM COATED ORAL
COMMUNITY
Start: 2023-02-20

## 2023-09-05 RX ORDER — FEXOFENADINE HCL AND PSEUDOEPHEDRINE HCI 60; 120 MG/1; MG/1
1 TABLET, EXTENDED RELEASE ORAL DAILY
Start: 2023-09-05

## 2023-09-05 ASSESSMENT — ENCOUNTER SYMPTOMS
SINUS PRESSURE: 0
DYSURIA: 0
ABDOMINAL PAIN: 0
ADENOPATHY: 0
COUGH: 0
LIGHT-HEADEDNESS: 0
HEMATURIA: 0
NUMBNESS: 0
CONSTIPATION: 0
NERVOUS/ANXIOUS: 0
POLYPHAGIA: 0
VOMITING: 0
DIAPHORESIS: 0
SORE THROAT: 0
NAUSEA: 0
UNEXPECTED WEIGHT CHANGE: 0
ARTHRALGIAS: 1
PALPITATIONS: 0
HEADACHES: 0
CHILLS: 0
DIZZINESS: 0
WHEEZING: 0
DYSPHORIC MOOD: 0
CONFUSION: 0
FREQUENCY: 0
FEVER: 0
POLYDIPSIA: 0
DIARRHEA: 0
SHORTNESS OF BREATH: 0
SINUS PAIN: 0
CHEST TIGHTNESS: 0

## 2023-09-05 NOTE — PROGRESS NOTES
"Subjective   Patient ID: Kalie Galdamez is a 52 y.o. female who presents for having done density in October, xray for ribs (She looked and saw her results online, chiropractor said she had broken ribs,  she was dx with osteopenia in her 20's but quite taking calcium a long time ago, she has recently started back on supplements.//Pt is not taking any medication except thyroid until after her bone density test/Refusing flu vaccine).    routine follow up. chronic issues as per assessment and plan.     Has been having some pain in her right arm. Started 2 weeks ago. Has been getting worse over the past 2 weeks. Yesterday she scrubbed her carpets and that really caused her pain. Went to a chiropractor today and has had some improvement. She goes back to see him again later this week.     Concerned about what she calls \"brain zaps.\" She is in bed asleep and then will get a \"jolt\" in her head that wakes her up. It is not really pain. When it happens, gets very nervous and anxious. She states one time that it happened, she felt \"messed up\" into the next day. She spoke with her chiropractor about this and he thought she may be low on magnesium.       Orders Only on 06/21/2023   Component Date Value Ref Range Status    HLA-DQB1*02:01 06/21/2023 POSITIVE   Final    HLA-DQB1*02:02 06/21/2023 NEGATIVE   Final    HLA-DQB1*03:02(DQ8) 06/21/2023 NEGATIVE   Final    Comment:   HLA TYPING, CELIAC DISEASE  Test performed at   [facility]    Histocompatibility and Immunogenetics Laboratory   [facility], 6th Floor   [address] [place] [address]       Tissue Transglutaminase, IgA 06/21/2023 16 (H)  0 - 14 U/mL Final    Comment:  Presence of one or more of Tissue Transglutaminase antibodies   (TTG IgA/G) or Deamidated Gliadin antibodies (DGP IgA/G) is    suggestive of celiac disease.  In isolation, a positive TTG    IgA/G or DGP IgA/G test is not diagnostic of celiac disease.   Higher " antibody titers are more strongly associated with a   true positive result. Additional assessment by alternate   antibodies or duodenal biopsy are needed to complete the   diagnostic evaluation.        Tissue Transglutamase IgG 06/21/2023 <1  0 - 14 U/mL Final    Comment:  False negative Tissue Transglutaminase Antibody, IgG   results can occur in patients already adhering to a   gluten-free diet.  Tissue Transglutaminase Antibody,   IgA is the preferred test for screening patients with   suspected Celiac Disease.       DEAMIDATED GLIADIN PEPTIDE IGA 06/21/2023 27 (H)  0 - 14 U/mL Final    Comment:  Presence of one or more of Tissue Transglutaminase   antibodies (TTG IgA/G) or Deamidated Gliadin Peptide   antibodies (DGP IgA/G) is suggestive of celiac disease.    In isolation, a positive TTG IgA/G or DGP IgA/G test is   not diagnostic of celiac disease. Higher antibody titers   are more strongly associated with a true positive result.   Additional assessment by alternate antibodies or duodenal   biopsy are needed to complete the diagnostic evaluation.        DEAMIDATED GLIADIN PEPTIDE IGG 06/21/2023 1  0 - 14 U/mL Final    Comment:  False negative Deamidated Gliadin Peptide Antibody,   IgG results can occur in patients already adhering   to a gluten-free diet. Tissue Transglutaminase   Antibody, IgA is the preferred test for screening   patients with suspected Celiac Disease.      Lab on 06/14/2023   Component Date Value Ref Range Status    WBC 06/14/2023 7.1  4.4 - 11.3 x10E9/L Final    RBC 06/14/2023 4.82  4.00 - 5.20 x10E12/L Final    Hemoglobin 06/14/2023 13.7  12.0 - 16.0 g/dL Final    Hematocrit 06/14/2023 43.0  36.0 - 46.0 % Final    MCV 06/14/2023 89  80 - 100 fL Final    MCHC 06/14/2023 31.9 (L)  32.0 - 36.0 g/dL Final    Platelets 06/14/2023 335  150 - 450 x10E9/L Final    RDW 06/14/2023 13.2  11.5 - 14.5 % Final    Neutrophils % 06/14/2023 42.2  40.0 - 80.0 % Final    Immature Granulocytes %, Automated  06/14/2023 0.3  0.0 - 0.9 % Final     Immature Granulocyte Count (IG) includes promyelocytes,    myelocytes and metamyelocytes but does not include bands.   Percent differential counts (%) should be interpreted in the   context of the absolute cell counts (cells/L).    Lymphocytes % 06/14/2023 46.8  13.0 - 44.0 % Final    Monocytes % 06/14/2023 7.0  2.0 - 10.0 % Final    Eosinophils % 06/14/2023 2.9  0.0 - 6.0 % Final    Basophils % 06/14/2023 0.8  0.0 - 2.0 % Final    Neutrophils Absolute 06/14/2023 3.00  1.20 - 7.70 x10E9/L Final    Lymphocytes Absolute 06/14/2023 3.33  1.20 - 4.80 x10E9/L Final    Monocytes Absolute 06/14/2023 0.50  0.10 - 1.00 x10E9/L Final    Eosinophils Absolute 06/14/2023 0.21  0.00 - 0.70 x10E9/L Final    Basophils Absolute 06/14/2023 0.06  0.00 - 0.10 x10E9/L Final    Glucose 06/14/2023 82  74 - 99 mg/dL Final    Sodium 06/14/2023 140  136 - 145 mmol/L Final    Potassium 06/14/2023 4.3  3.5 - 5.3 mmol/L Final    Chloride 06/14/2023 103  98 - 107 mmol/L Final    Bicarbonate 06/14/2023 30  21 - 32 mmol/L Final    Anion Gap 06/14/2023 11  10 - 20 mmol/L Final    Urea Nitrogen 06/14/2023 16  6 - 23 mg/dL Final    Creatinine 06/14/2023 0.79  0.50 - 1.05 mg/dL Final    GFR Female 06/14/2023 90  >90 mL/min/1.73m2 Final     CALCULATIONS OF ESTIMATED GFR ARE PERFORMED   USING THE 2021 CKD-EPI STUDY REFIT EQUATION   WITHOUT THE RACE VARIABLE FOR THE IDMS-TRACEABLE   CREATININE METHODS.    https://jasn.asnjournals.org/content/early/2021/09/22/ASN.6274544722    Calcium 06/14/2023 9.6  8.6 - 10.3 mg/dL Final    Albumin 06/14/2023 4.2  3.4 - 5.0 g/dL Final    Alkaline Phosphatase 06/14/2023 33  33 - 110 U/L Final    Total Protein 06/14/2023 7.2  6.4 - 8.2 g/dL Final    AST 06/14/2023 17  9 - 39 U/L Final    Total Bilirubin 06/14/2023 0.4  0.0 - 1.2 mg/dL Final    ALT (SGPT) 06/14/2023 18  7 - 45 U/L Final     Patients treated with Sulfasalazine may generate    falsely decreased results for ALT.     Cholesterol 06/14/2023 199  0 - 199 mg/dL Final    .      AGE      DESIRABLE   BORDERLINE HIGH   HIGH     0-19 Y     0 - 169       170 - 199     >/= 200    20-24 Y     0 - 189       190 - 224     >/= 225         >24 Y     0 - 199       200 - 239     >/= 240   **All ranges are based on fasting samples. Specific   therapeutic targets will vary based on patient-specific   cardiac risk.  .   Pediatric guidelines reference:Pediatrics 2011, 128(S5).   Adult guidelines reference: NCEP ATPIII Guidelines,     GREGORY 2001, 258:2486-97  .   Venipuncture immediately after or during the    administration of Metamizole may lead to falsely   low results. Testing should be performed immediately   prior to Metamizole dosing.    HDL 06/14/2023 60.8  mg/dL Final    .      AGE      VERY LOW   LOW     NORMAL    HIGH       0-19 Y       < 35   < 40     40-45     ----    20-24 Y       ----   < 40       >45     ----      >24 Y       ----   < 40     40-60      >60  .    Cholesterol/HDL Ratio 06/14/2023 3.3   Final    REF VALUES  DESIRABLE  < 3.4  HIGH RISK  > 5.0    LDL 06/14/2023 119 (H)  0 - 99 mg/dL Final    .                           NEAR      BORD      AGE      DESIRABLE  OPTIMAL    HIGH     HIGH     VERY HIGH     0-19 Y     0 - 109     ---    110-129   >/= 130     ----    20-24 Y     0 - 119     ---    120-159   >/= 160     ----      >24 Y     0 -  99   100-129  130-159   160-189     >/=190  .    VLDL 06/14/2023 19  0 - 40 mg/dL Final    Triglycerides 06/14/2023 96  0 - 149 mg/dL Final    .      AGE      DESIRABLE   BORDERLINE HIGH   HIGH     VERY HIGH   0 D-90 D    19 - 174         ----         ----        ----  91 D- 9 Y     0 -  74        75 -  99     >/= 100      ----    10-19 Y     0 -  89        90 - 129     >/= 130      ----    20-24 Y     0 - 114       115 - 149     >/= 150      ----         >24 Y     0 - 149       150 - 199    200- 499    >/= 500  .   Venipuncture immediately after or during the    administration of Metamizole  may lead to falsely   low results. Testing should be performed immediately   prior to Metamizole dosing.    TSH 06/14/2023 1.50  0.44 - 3.98 mIU/L Final     TSH testing is performed using different testing    methodology at [facility] than at other    [facility]. Direct result comparisons should    only be made within the same method.    Hepatitis C Ab 06/14/2023 NONREACTIVE  NONREACTIVE Final     Results from patients taking biotin supplements or receiving   high-dose biotin therapy should be interpreted with caution   due to possible interference with this test. Providers may    contact their local laboratory for further information.    HIV 1 and 2 Screen 06/14/2023 NONREACTIVE  NONREACTIVE Final     HIV Ag/Ab screen is performed using the Siemens Unight   HIV Ag/Ab Combo assay which detects the presence of HIV    p24 antigen as well as antibodies to HIV-1   (Group M and O) and HIV-2.  .  No laboratory evidence of HIV infection. If acute HIV infection is   suspected, consider testing for HIV RNA by PCR (viral load).   Orders Only on 05/13/2023   Component Date Value Ref Range Status    Calprotectin, Stool 05/13/2023 49  <=49 ug/g Final    Comment: REFERENCE INTERVAL: Calprotectin, Fecal by Immunoassay    Less than 50 ug/g.........Normal     ug/g...............Borderline elevated, test should be                              re-evaluated in 4-6 weeks.    121 ug/g or greater.......Elevated  Performed By: [facility]  [address] [place] [id number]  : Ady Gonzalez MD, PhD     Orders Only on 05/13/2023   Component Date Value Ref Range Status    Pancreatic Elastase, Fecal 05/13/2023 630  >=100 ug/g Final    Comment: REFERENCE INTERVAL: Pancreatic Elastase Fecal by                       Immunoassay    Less than 100 ug/g............Severe insufficiency    100 - 199 ug/g................Moderate insufficiency    200 ug/g or  greater...........Normal  INTERPRETIVE INFORMATION: Pancreatic Elastase                             Fecal by Immunoassay  Reference intervals do not apply for infants less than one month   old.  Performed by [facility],                              [address] [place], [place] [address] [address] [phone]                    [email or web address], Ady Gonzalez MD, PHD - Lab. Director     Orders Only on 05/04/2023   Component Date Value Ref Range Status    Tissue Transglutaminase, IgA 05/04/2023 26 (H)  0 - 14 U/mL Final    Comment:  Presence of one or more of Tissue Transglutaminase antibodies   (TTG IgA/G) or Deamidated Gliadin antibodies (DGP IgA/G) is    suggestive of celiac disease.  In isolation, a positive TTG    IgA/G or DGP IgA/G test is not diagnostic of celiac disease.   Higher antibody titers are more strongly associated with a   true positive result. Additional assessment by alternate   antibodies or duodenal biopsy are needed to complete the   diagnostic evaluation.           RIBS, BILATERAL; W CXR MIN 4 VIEWS 8/3/23  FINDINGS:  No evidence of rib fracture or lesion. No consolidation or edema.  Previous cholecystectomy. Thoracic degenerative change.    Review of Systems   Constitutional:  Negative for chills, diaphoresis, fever and unexpected weight change.   HENT:  Negative for congestion, sinus pressure, sinus pain, sneezing and sore throat.    Respiratory:  Negative for cough, chest tightness, shortness of breath and wheezing.    Cardiovascular:  Negative for chest pain, palpitations and leg swelling.   Gastrointestinal:  Negative for abdominal pain, constipation, diarrhea, nausea and vomiting.   Endocrine: Negative for cold intolerance, heat intolerance, polydipsia, polyphagia and polyuria.   Genitourinary:  Negative for dysuria, frequency, hematuria and urgency.   Musculoskeletal:  Positive for arthralgias.   Neurological:  Negative for dizziness, syncope, light-headedness, numbness and headaches.  "  Hematological:  Negative for adenopathy.   Psychiatric/Behavioral:  Negative for confusion and dysphoric mood. The patient is not nervous/anxious.        Objective   /80 (BP Location: Left arm, Patient Position: Sitting, BP Cuff Size: Large adult)   Pulse 76   Temp 36.7 °C (98 °F)   Resp 16   Ht 1.575 m (5' 2\")   Wt 83 kg (183 lb)   SpO2 96%   BMI 33.47 kg/m²     Physical Exam  Vitals and nursing note reviewed.   Constitutional:       General: She is not in acute distress.     Appearance: Normal appearance.   HENT:      Head: Normocephalic and atraumatic.      Nose: Nose normal.   Eyes:      Extraocular Movements: Extraocular movements intact.      Conjunctiva/sclera: Conjunctivae normal.      Pupils: Pupils are equal, round, and reactive to light.   Cardiovascular:      Rate and Rhythm: Normal rate and regular rhythm.      Heart sounds: No murmur heard.     No friction rub. No gallop.   Pulmonary:      Effort: Pulmonary effort is normal.      Breath sounds: Normal breath sounds. No wheezing, rhonchi or rales.   Abdominal:      General: Bowel sounds are normal. There is no distension.      Palpations: Abdomen is soft.      Tenderness: There is no abdominal tenderness.   Musculoskeletal:         General: Normal range of motion.      Cervical back: Normal range of motion and neck supple.   Skin:     General: Skin is warm and dry.   Neurological:      General: No focal deficit present.      Mental Status: She is alert and oriented to person, place, and time.      Deep Tendon Reflexes: Reflexes normal.   Psychiatric:         Mood and Affect: Mood normal.         Behavior: Behavior normal.         Thought Content: Thought content normal.         Judgment: Judgment normal.         Assessment/Plan   Problem List Items Addressed This Visit       Adult celiac disease     - follows with GI  - has been referred to dietician but has not seen them yet         Relevant Orders    Referral to Nutrition Services    " Magnesium    CBC and Auto Differential    Comprehensive Metabolic Panel    Adult hypothyroidism     - continue synthroid  - recheck labs  June 2024         Relevant Orders    Magnesium    CBC and Auto Differential    Comprehensive Metabolic Panel    TSH with reflex to Free T4 if abnormal    Allergic rhinitis, seasonal     - currently on allegra-D as needed          Relevant Medications    fexofenadine-pseudoephedrine (Allegra-D)  mg 12 hr tablet    Other Relevant Orders    Magnesium    CBC and Auto Differential    Comprehensive Metabolic Panel    BMI 33.0-33.9,adult     - Encouraged healthy lifestyle, including adequate exercise and high fiber, low fat and low carb diet.          Relevant Orders    Magnesium    CBC and Auto Differential    Comprehensive Metabolic Panel    Cigarette nicotine dependence in remission     - check LDCT in 9/2023         Relevant Orders    CT lung screening low dose    Magnesium    CBC and Auto Differential    Comprehensive Metabolic Panel    Class 1 obesity due to excess calories without serious comorbidity with body mass index (BMI) of 33.0 to 33.9 in adult     - Encouraged healthy lifestyle, including adequate exercise and high fiber, low fat and low carb diet.          Relevant Orders    Magnesium    CBC and Auto Differential    Comprehensive Metabolic Panel    Encounter for immunization     - Declined flu vaccine.    - recommended Tdap and Shingrix at the pharmacy          Relevant Orders    Magnesium    CBC and Auto Differential    Comprehensive Metabolic Panel    Essential hypertension - Primary     - diet controlled. Continue to monitor         Relevant Orders    Magnesium    CBC and Auto Differential    Comprehensive Metabolic Panel    Gastroesophageal reflux disease without esophagitis    Relevant Medications    omeprazole (PriLOSEC) 40 mg DR capsule    Other Relevant Orders    Magnesium    CBC and Auto Differential    Comprehensive Metabolic Panel    Lung nodule     -  4  mm ground glass nodule in right lung  - per Fleischner  criteria, no routine follow up needed  - will check LDCT in 9/2023 as she does have smoking history          Relevant Orders    Magnesium    CBC and Auto Differential    Comprehensive Metabolic Panel    Menopause    Relevant Orders    Follicle Stimulating Hormone    Luteinizing Hormone    Estrogens, Total    Magnesium    CBC and Auto Differential    Comprehensive Metabolic Panel    Osteopenia     - will be going for DEXA scan 10/3/23 through Dr. Saldivar         Relevant Orders    Magnesium    CBC and Auto Differential    Comprehensive Metabolic Panel    Right arm pain     - has been present for 2 weeks now  - improved somewhat today   - will continue to monitor. If continues, will start with some imaging.          Relevant Orders    Magnesium    CBC and Auto Differential    Comprehensive Metabolic Panel    Visit for screening mammogram     - will be getting on 10/3/23 through Dr. Saldivar         Relevant Orders    Magnesium    CBC and Auto Differential    Comprehensive Metabolic Panel     Other Visit Diagnoses       Vitamin deficiency        Relevant Orders    Vitamin B12    Vitamin D 25-Hydroxy,Total (for eval of Vitamin D levels)    CBC and Auto Differential    Comprehensive Metabolic Panel    Vitamin B1, Whole Blood    Vitamin B2, Whole Blood    Vitamin B3 (Niacin and Metabolites)    Vitamin B6

## 2023-09-05 NOTE — PATIENT INSTRUCTIONS
Kalie Galdamez ,    Thank you for coming in today. We at [facility] appreciate your trust in our care. If you have any questions or concerns about the care you received today, please do not hesitate to contact us at [phone].    The following instructions were discussed today:    - get CT scan of lungs   - get mammogram and DEXA scan on 10/3/23 as scheduled   - I recommend you get Tdap (tetanus) vaccine at your pharmacy.    - I recommend getting Shingrix (shingles) vaccine at your pharmacy.    - get labs   - Follow up in 3 months.

## 2023-09-05 NOTE — ASSESSMENT & PLAN NOTE
- has been present for 2 weeks now  - improved somewhat today   - will continue to monitor. If continues, will start with some imaging.

## 2023-09-28 ENCOUNTER — LAB (OUTPATIENT)
Dept: LAB | Facility: LAB | Age: 53
End: 2023-09-28
Payer: COMMERCIAL

## 2023-09-28 DIAGNOSIS — R91.1 LUNG NODULE: ICD-10-CM

## 2023-09-28 DIAGNOSIS — E66.09 CLASS 1 OBESITY DUE TO EXCESS CALORIES WITHOUT SERIOUS COMORBIDITY WITH BODY MASS INDEX (BMI) OF 33.0 TO 33.9 IN ADULT: ICD-10-CM

## 2023-09-28 DIAGNOSIS — E56.9 VITAMIN DEFICIENCY: ICD-10-CM

## 2023-09-28 DIAGNOSIS — J30.2 SEASONAL ALLERGIC RHINITIS, UNSPECIFIED TRIGGER: ICD-10-CM

## 2023-09-28 DIAGNOSIS — M79.601 RIGHT ARM PAIN: ICD-10-CM

## 2023-09-28 DIAGNOSIS — I10 ESSENTIAL HYPERTENSION: ICD-10-CM

## 2023-09-28 DIAGNOSIS — K90.0 ADULT CELIAC DISEASE (HHS-HCC): ICD-10-CM

## 2023-09-28 DIAGNOSIS — K21.9 GASTROESOPHAGEAL REFLUX DISEASE WITHOUT ESOPHAGITIS: ICD-10-CM

## 2023-09-28 DIAGNOSIS — Z23 ENCOUNTER FOR IMMUNIZATION: ICD-10-CM

## 2023-09-28 DIAGNOSIS — M85.80 OSTEOPENIA, UNSPECIFIED LOCATION: ICD-10-CM

## 2023-09-28 DIAGNOSIS — Z78.0 MENOPAUSE: ICD-10-CM

## 2023-09-28 DIAGNOSIS — Z12.31 VISIT FOR SCREENING MAMMOGRAM: ICD-10-CM

## 2023-09-28 DIAGNOSIS — E03.9 ADULT HYPOTHYROIDISM: ICD-10-CM

## 2023-09-28 DIAGNOSIS — F17.211 CIGARETTE NICOTINE DEPENDENCE IN REMISSION: ICD-10-CM

## 2023-09-28 LAB
ALANINE AMINOTRANSFERASE (SGPT) (U/L) IN SER/PLAS: 22 U/L (ref 7–45)
ALBUMIN (G/DL) IN SER/PLAS: 4.5 G/DL (ref 3.4–5)
ALKALINE PHOSPHATASE (U/L) IN SER/PLAS: 40 U/L (ref 33–110)
ANION GAP IN SER/PLAS: 13 MMOL/L (ref 10–20)
ASPARTATE AMINOTRANSFERASE (SGOT) (U/L) IN SER/PLAS: 20 U/L (ref 9–39)
BASOPHILS (10*3/UL) IN BLOOD BY AUTOMATED COUNT: 0.06 X10E9/L (ref 0–0.1)
BASOPHILS/100 LEUKOCYTES IN BLOOD BY AUTOMATED COUNT: 0.6 % (ref 0–2)
BILIRUBIN TOTAL (MG/DL) IN SER/PLAS: 0.3 MG/DL (ref 0–1.2)
CALCIUM (MG/DL) IN SER/PLAS: 9.5 MG/DL (ref 8.6–10.3)
CARBON DIOXIDE, TOTAL (MMOL/L) IN SER/PLAS: 28 MMOL/L (ref 21–32)
CHLORIDE (MMOL/L) IN SER/PLAS: 102 MMOL/L (ref 98–107)
CREATININE (MG/DL) IN SER/PLAS: 0.76 MG/DL (ref 0.5–1.05)
EOSINOPHILS (10*3/UL) IN BLOOD BY AUTOMATED COUNT: 0.23 X10E9/L (ref 0–0.7)
EOSINOPHILS/100 LEUKOCYTES IN BLOOD BY AUTOMATED COUNT: 2.4 % (ref 0–6)
ERYTHROCYTE DISTRIBUTION WIDTH (RATIO) BY AUTOMATED COUNT: 12.9 % (ref 11.5–14.5)
ERYTHROCYTE MEAN CORPUSCULAR HEMOGLOBIN CONCENTRATION (G/DL) BY AUTOMATED: 32.6 G/DL (ref 32–36)
ERYTHROCYTE MEAN CORPUSCULAR VOLUME (FL) BY AUTOMATED COUNT: 90 FL (ref 80–100)
ERYTHROCYTES (10*6/UL) IN BLOOD BY AUTOMATED COUNT: 4.57 X10E12/L (ref 4–5.2)
GFR FEMALE: >90 ML/MIN/1.73M2
GLUCOSE (MG/DL) IN SER/PLAS: 82 MG/DL (ref 74–99)
HEMATOCRIT (%) IN BLOOD BY AUTOMATED COUNT: 41.1 % (ref 36–46)
HEMOGLOBIN (G/DL) IN BLOOD: 13.4 G/DL (ref 12–16)
IMMATURE GRANULOCYTES/100 LEUKOCYTES IN BLOOD BY AUTOMATED COUNT: 0.3 % (ref 0–0.9)
LEUKOCYTES (10*3/UL) IN BLOOD BY AUTOMATED COUNT: 9.6 X10E9/L (ref 4.4–11.3)
LYMPHOCYTES (10*3/UL) IN BLOOD BY AUTOMATED COUNT: 3.26 X10E9/L (ref 1.2–4.8)
LYMPHOCYTES/100 LEUKOCYTES IN BLOOD BY AUTOMATED COUNT: 33.9 % (ref 13–44)
MAGNESIUM (MG/DL) IN SER/PLAS: 2.25 MG/DL (ref 1.6–2.4)
MONOCYTES (10*3/UL) IN BLOOD BY AUTOMATED COUNT: 0.51 X10E9/L (ref 0.1–1)
MONOCYTES/100 LEUKOCYTES IN BLOOD BY AUTOMATED COUNT: 5.3 % (ref 2–10)
NEUTROPHILS (10*3/UL) IN BLOOD BY AUTOMATED COUNT: 5.53 X10E9/L (ref 1.2–7.7)
NEUTROPHILS/100 LEUKOCYTES IN BLOOD BY AUTOMATED COUNT: 57.5 % (ref 40–80)
PLATELETS (10*3/UL) IN BLOOD AUTOMATED COUNT: 343 X10E9/L (ref 150–450)
POTASSIUM (MMOL/L) IN SER/PLAS: 3.8 MMOL/L (ref 3.5–5.3)
PROTEIN TOTAL: 7.4 G/DL (ref 6.4–8.2)
SODIUM (MMOL/L) IN SER/PLAS: 139 MMOL/L (ref 136–145)
THYROTROPIN (MIU/L) IN SER/PLAS BY DETECTION LIMIT <= 0.05 MIU/L: 0.67 MIU/L (ref 0.44–3.98)
UREA NITROGEN (MG/DL) IN SER/PLAS: 15 MG/DL (ref 6–23)

## 2023-09-28 PROCEDURE — 80053 COMPREHEN METABOLIC PANEL: CPT

## 2023-09-28 PROCEDURE — 83002 ASSAY OF GONADOTROPIN (LH): CPT

## 2023-09-28 PROCEDURE — 84207 ASSAY OF VITAMIN B-6: CPT

## 2023-09-28 PROCEDURE — 82672 ASSAY OF ESTROGEN: CPT

## 2023-09-28 PROCEDURE — 84591 ASSAY OF NOS VITAMIN: CPT

## 2023-09-28 PROCEDURE — 83735 ASSAY OF MAGNESIUM: CPT

## 2023-09-28 PROCEDURE — 85025 COMPLETE CBC W/AUTO DIFF WBC: CPT

## 2023-09-28 PROCEDURE — 82306 VITAMIN D 25 HYDROXY: CPT

## 2023-09-28 PROCEDURE — 84425 ASSAY OF VITAMIN B-1: CPT

## 2023-09-28 PROCEDURE — 82607 VITAMIN B-12: CPT

## 2023-09-28 PROCEDURE — 36415 COLL VENOUS BLD VENIPUNCTURE: CPT

## 2023-09-28 PROCEDURE — 84252 ASSAY OF VITAMIN B-2: CPT

## 2023-09-28 PROCEDURE — 83001 ASSAY OF GONADOTROPIN (FSH): CPT

## 2023-09-28 PROCEDURE — 84443 ASSAY THYROID STIM HORMONE: CPT

## 2023-09-29 LAB
CALCIDIOL (25 OH VITAMIN D3) (NG/ML) IN SER/PLAS: 25 NG/ML
COBALAMIN (VITAMIN B12) (PG/ML) IN SER/PLAS: 230 PG/ML (ref 211–911)
FOLLITROPIN (IU/L) IN SER/PLAS: 114.9 IU/L
LUTEINIZING HORMONE (IU/ML) IN SER/PLAS: 48.4 IU/L

## 2023-10-02 ENCOUNTER — TELEPHONE (OUTPATIENT)
Dept: PRIMARY CARE | Facility: CLINIC | Age: 53
End: 2023-10-02
Payer: COMMERCIAL

## 2023-10-02 DIAGNOSIS — E53.8 VITAMIN B12 DEFICIENCY: Primary | ICD-10-CM

## 2023-10-02 DIAGNOSIS — E55.9 VITAMIN D DEFICIENCY: ICD-10-CM

## 2023-10-02 LAB
VITAMIN B1, WHOLE BLOOD: 117 NMOL/L (ref 70–180)
VITAMIN B2, PLASMA: 7 NMOL/L (ref 5–50)
VITAMIN B6: 109.3 NMOL/L (ref 20–125)

## 2023-10-02 RX ORDER — LANOLIN ALCOHOL/MO/W.PET/CERES
1000 CREAM (GRAM) TOPICAL DAILY
Qty: 90 TABLET | Refills: 3 | Status: SHIPPED | OUTPATIENT
Start: 2023-10-02 | End: 2024-10-01

## 2023-10-02 RX ORDER — ACETAMINOPHEN 500 MG
50 TABLET ORAL DAILY
Qty: 90 CAPSULE | Refills: 3 | Status: SHIPPED | OUTPATIENT
Start: 2023-10-02 | End: 2024-10-01

## 2023-10-03 ENCOUNTER — ANCILLARY PROCEDURE (OUTPATIENT)
Dept: RADIOLOGY | Facility: CLINIC | Age: 53
End: 2023-10-03
Payer: COMMERCIAL

## 2023-10-03 VITALS — HEIGHT: 62 IN | BODY MASS INDEX: 33.13 KG/M2 | WEIGHT: 180 LBS

## 2023-10-03 DIAGNOSIS — M85.80 OTHER SPECIFIED DISORDERS OF BONE DENSITY AND STRUCTURE, UNSPECIFIED SITE: ICD-10-CM

## 2023-10-03 DIAGNOSIS — Z12.31 ENCOUNTER FOR SCREENING MAMMOGRAM FOR MALIGNANT NEOPLASM OF BREAST: ICD-10-CM

## 2023-10-03 PROCEDURE — 77067 SCR MAMMO BI INCL CAD: CPT | Mod: BILATERAL PROCEDURE | Performed by: RADIOLOGY

## 2023-10-03 PROCEDURE — 77080 DXA BONE DENSITY AXIAL: CPT | Performed by: RADIOLOGY

## 2023-10-03 PROCEDURE — 77063 BREAST TOMOSYNTHESIS BI: CPT | Mod: BILATERAL PROCEDURE | Performed by: RADIOLOGY

## 2023-10-03 PROCEDURE — 77067 SCR MAMMO BI INCL CAD: CPT | Mod: 50

## 2023-10-03 PROCEDURE — 77080 DXA BONE DENSITY AXIAL: CPT

## 2023-10-03 PROCEDURE — 77063 BREAST TOMOSYNTHESIS BI: CPT

## 2023-10-03 NOTE — TELEPHONE ENCOUNTER
please let patient know:    1) Please let patient know that vitamin D level is low. I recommend taking vitamin D 2000 IU daily. Recheck vitamin D in 3 months. Order placed.     2) please let patient know  vitamin B12 is low. Start vitamin B12 1000 mcg daily. Recheck labs in 3 months     3) hormone levels are consistent with menopause

## 2023-10-05 LAB — ESTROGEN,TOTAL: 55 PG/ML

## 2023-10-06 LAB — NIACIN (VITAMIN B3): NORMAL

## 2023-11-28 ENCOUNTER — APPOINTMENT (OUTPATIENT)
Dept: RADIOLOGY | Facility: CLINIC | Age: 53
End: 2023-11-28
Payer: COMMERCIAL

## 2023-11-28 ENCOUNTER — APPOINTMENT (OUTPATIENT)
Dept: RADIOLOGY | Facility: HOSPITAL | Age: 53
End: 2023-11-28
Payer: COMMERCIAL

## 2023-11-28 ENCOUNTER — ANCILLARY PROCEDURE (OUTPATIENT)
Dept: RADIOLOGY | Facility: CLINIC | Age: 53
End: 2023-11-28
Payer: COMMERCIAL

## 2023-11-28 DIAGNOSIS — F17.211 NICOTINE DEPENDENCE, CIGARETTES, IN REMISSION: ICD-10-CM

## 2023-11-28 PROCEDURE — 71271 CT THORAX LUNG CANCER SCR C-: CPT | Performed by: RADIOLOGY

## 2023-11-28 PROCEDURE — 71271 CT THORAX LUNG CANCER SCR C-: CPT

## 2023-12-12 ENCOUNTER — APPOINTMENT (OUTPATIENT)
Dept: PRIMARY CARE | Facility: CLINIC | Age: 53
End: 2023-12-12
Payer: COMMERCIAL

## 2023-12-13 ENCOUNTER — OFFICE VISIT (OUTPATIENT)
Dept: PRIMARY CARE | Facility: CLINIC | Age: 53
End: 2023-12-13
Payer: COMMERCIAL

## 2023-12-13 VITALS
TEMPERATURE: 98 F | OXYGEN SATURATION: 98 % | SYSTOLIC BLOOD PRESSURE: 137 MMHG | HEART RATE: 87 BPM | HEIGHT: 62 IN | WEIGHT: 190 LBS | DIASTOLIC BLOOD PRESSURE: 78 MMHG | RESPIRATION RATE: 16 BRPM | BODY MASS INDEX: 34.96 KG/M2

## 2023-12-13 DIAGNOSIS — J01.80 OTHER ACUTE SINUSITIS, RECURRENCE NOT SPECIFIED: ICD-10-CM

## 2023-12-13 DIAGNOSIS — E55.9 VITAMIN D DEFICIENCY: ICD-10-CM

## 2023-12-13 DIAGNOSIS — I10 ESSENTIAL HYPERTENSION: ICD-10-CM

## 2023-12-13 DIAGNOSIS — K21.9 GASTROESOPHAGEAL REFLUX DISEASE WITHOUT ESOPHAGITIS: ICD-10-CM

## 2023-12-13 DIAGNOSIS — E66.09 CLASS 1 OBESITY DUE TO EXCESS CALORIES WITH SERIOUS COMORBIDITY AND BODY MASS INDEX (BMI) OF 34.0 TO 34.9 IN ADULT: ICD-10-CM

## 2023-12-13 DIAGNOSIS — J30.2 SEASONAL ALLERGIC RHINITIS, UNSPECIFIED TRIGGER: ICD-10-CM

## 2023-12-13 DIAGNOSIS — E53.8 VITAMIN B12 DEFICIENCY: ICD-10-CM

## 2023-12-13 DIAGNOSIS — K90.0 ADULT CELIAC DISEASE (HHS-HCC): ICD-10-CM

## 2023-12-13 DIAGNOSIS — M85.80 OSTEOPENIA, UNSPECIFIED LOCATION: ICD-10-CM

## 2023-12-13 DIAGNOSIS — R91.1 LUNG NODULE: Primary | ICD-10-CM

## 2023-12-13 DIAGNOSIS — F40.243 ANXIETY WITH FLYING: ICD-10-CM

## 2023-12-13 DIAGNOSIS — Z23 ENCOUNTER FOR IMMUNIZATION: ICD-10-CM

## 2023-12-13 DIAGNOSIS — E03.9 ADULT HYPOTHYROIDISM: ICD-10-CM

## 2023-12-13 PROBLEM — M79.601 RIGHT ARM PAIN: Status: RESOLVED | Noted: 2023-09-05 | Resolved: 2023-12-13

## 2023-12-13 PROBLEM — N39.3 URINARY, INCONTINENCE, STRESS FEMALE: Status: RESOLVED | Noted: 2023-05-24 | Resolved: 2023-12-13

## 2023-12-13 PROBLEM — Z12.31 VISIT FOR SCREENING MAMMOGRAM: Status: RESOLVED | Noted: 2023-09-05 | Resolved: 2023-12-13

## 2023-12-13 PROBLEM — E66.9 OBESITY, UNSPECIFIED: Status: ACTIVE | Noted: 2023-05-24

## 2023-12-13 PROCEDURE — 99214 OFFICE O/P EST MOD 30 MIN: CPT | Performed by: FAMILY MEDICINE

## 2023-12-13 PROCEDURE — 3078F DIAST BP <80 MM HG: CPT | Performed by: FAMILY MEDICINE

## 2023-12-13 PROCEDURE — 3075F SYST BP GE 130 - 139MM HG: CPT | Performed by: FAMILY MEDICINE

## 2023-12-13 PROCEDURE — 1036F TOBACCO NON-USER: CPT | Performed by: FAMILY MEDICINE

## 2023-12-13 PROCEDURE — 3008F BODY MASS INDEX DOCD: CPT | Performed by: FAMILY MEDICINE

## 2023-12-13 RX ORDER — AZITHROMYCIN 250 MG/1
TABLET, FILM COATED ORAL
Qty: 6 TABLET | Refills: 0 | Status: SHIPPED | OUTPATIENT
Start: 2023-12-13 | End: 2023-12-18

## 2023-12-13 RX ORDER — ALPRAZOLAM 0.25 MG/1
.25-.5 TABLET ORAL 3 TIMES DAILY PRN
Qty: 12 TABLET | Refills: 0 | Status: SHIPPED | OUTPATIENT
Start: 2023-12-13 | End: 2023-12-15

## 2023-12-13 ASSESSMENT — ENCOUNTER SYMPTOMS
LIGHT-HEADEDNESS: 0
POLYPHAGIA: 0
NUMBNESS: 0
HEADACHES: 0
COUGH: 0
UNEXPECTED WEIGHT CHANGE: 0
WHEEZING: 0
NAUSEA: 0
POLYDIPSIA: 0
ABDOMINAL PAIN: 0
CONFUSION: 0
SHORTNESS OF BREATH: 0
NERVOUS/ANXIOUS: 0
SINUS PAIN: 0
SORE THROAT: 0
DIAPHORESIS: 0
FREQUENCY: 0
ADENOPATHY: 0
DYSPHORIC MOOD: 0
SINUS PRESSURE: 0
CONSTIPATION: 0
HEMATURIA: 0
PALPITATIONS: 0
CHILLS: 0
DYSURIA: 0
DIZZINESS: 0
VOMITING: 0
FEVER: 0
DIARRHEA: 0
CHEST TIGHTNESS: 0

## 2023-12-13 NOTE — ASSESSMENT & PLAN NOTE
- does not have this currently but is concerned that she will develop it when she is traveling to or from [place]. Will give her a prescription for [id number] to use if symptoms should happen

## 2023-12-13 NOTE — PROGRESS NOTES
"Subjective   Patient ID: Kalie Galdamez is a 53 y.o. female who presents for follow up (Will be traveling to [facility] on 12/30/23 and is very nervous about this trip.  Is she able to get ativan or xanax something for her nerves for flying??/Refusing flu vaccine/Pt has been having \"brain zaps\" but not due to medication or lack of. Been happening on and off since this summer, pt was wondering if it could be a menopausal thing?).    HPI     routine follow up. chronic issues as per assessment and plan.     She will be traveling to [place]. She is very nervous about flying. This is only her third time flying and she is very anxious about it. She is also nervous about potentially getting ill while over there or on her way home. She plans to wear a mask when she travels.  She does have a tendency to get sinus infections.     Concerned about weight. States she feels like she has no control over her eating. Appetite seems out of control. Has been gaining weight. Used to exercise but is not exercising as much as she used to.     Review of Systems   Constitutional:  Negative for chills, diaphoresis, fever and unexpected weight change.   HENT:  Negative for congestion, sinus pressure, sinus pain, sneezing and sore throat.    Respiratory:  Negative for cough, chest tightness, shortness of breath and wheezing.    Cardiovascular:  Negative for chest pain, palpitations and leg swelling.   Gastrointestinal:  Negative for abdominal pain, constipation, diarrhea, nausea and vomiting.   Endocrine: Negative for cold intolerance, heat intolerance, polydipsia, polyphagia and polyuria.   Genitourinary:  Negative for dysuria, frequency, hematuria and urgency.   Neurological:  Negative for dizziness, syncope, light-headedness, numbness and headaches.   Hematological:  Negative for adenopathy.   Psychiatric/Behavioral:  Negative for confusion and dysphoric mood. The patient is not nervous/anxious.        Objective   /78 (BP Location: " "Right arm, Patient Position: Sitting, BP Cuff Size: Adult long)   Pulse 87   Temp 36.7 °C (98 °F)   Resp 16   Ht 1.575 m (5' 2\")   Wt 86.2 kg (190 lb)   SpO2 98%   BMI 34.75 kg/m²     Physical Exam  Vitals and nursing note reviewed.   Constitutional:       General: She is not in acute distress.     Appearance: Normal appearance.   HENT:      Head: Normocephalic and atraumatic.      Nose: Nose normal.   Eyes:      Extraocular Movements: Extraocular movements intact.      Conjunctiva/sclera: Conjunctivae normal.      Pupils: Pupils are equal, round, and reactive to light.   Cardiovascular:      Rate and Rhythm: Normal rate and regular rhythm.      Heart sounds: No murmur heard.     No friction rub. No gallop.   Pulmonary:      Effort: Pulmonary effort is normal.      Breath sounds: Normal breath sounds. No wheezing, rhonchi or rales.   Abdominal:      General: Bowel sounds are normal. There is no distension.      Palpations: Abdomen is soft.      Tenderness: There is no abdominal tenderness.   Musculoskeletal:         General: Normal range of motion.      Cervical back: Normal range of motion and neck supple.   Skin:     General: Skin is warm and dry.   Neurological:      General: No focal deficit present.      Mental Status: She is alert and oriented to person, place, and time.      Deep Tendon Reflexes: Reflexes normal.   Psychiatric:         Mood and Affect: Mood normal.         Behavior: Behavior normal.         Thought Content: Thought content normal.         Judgment: Judgment normal.         Assessment/Plan   Problem List Items Addressed This Visit             ICD-10-CM    Adult celiac disease K90.0     - follows with GI  - has been referred to dietician but has not seen them yet         Adult hypothyroidism E03.9     - continue synthroid  - recheck labs  June 2024         Allergic rhinitis, seasonal J30.2     - currently on allegra-D as needed          Anxiety with flying F40.243     - xanax as needed " for flight         Relevant Medications    ALPRAZolam (Xanax) 0.25 mg tablet    BMI 34.0-34.9,adult Z68.34     - Encouraged healthy lifestyle, including adequate exercise and high fiber, low fat and low carb diet.          Class 1 obesity due to excess calories with serious comorbidity and body mass index (BMI) of 34.0 to 34.9 in adult E66.09, Z68.34     - Encouraged healthy lifestyle, including adequate exercise and high fiber, low fat and low carb diet.          Encounter for immunization Z23     - Declined flu vaccine.    - recommended the following vaccines at the pharmacy: Tdap, Shingrix, COVID-19         Essential hypertension I10     - diet controlled. Continue to monitor         Gastroesophageal reflux disease without esophagitis K21.9     - controlled on omeprazole          Lung nodule - Primary R91.1     - 11/2023 LDCT --> (1) 2 mm right middle lobe parenchymal lung nodule (2) 5 mm right minor fissural nodule (3) 2 mm parenchymal lung nodule (4) 2 mm subpleural left lower lobe parenchymal lung nodule. (5) There are no suspicious parenchymal lung nodules.  - repeat LDCT 11/2024         Osteopenia M85.80     - recommended calcium plus D  - recommended weight bearing exercises  - recheck DEXA 10/2025         Other acute sinusitis J01.80     - does not have this currently but is concerned that she will develop it when she is traveling to or from [place]. Will give her a prescription for z-gianfranco to use if symptoms should happen          Relevant Medications    azithromycin (Zithromax) 250 mg tablet    Vitamin B12 deficiency E53.8     - continue vitamin B12  - check labs          Relevant Orders    Vitamin B12    Vitamin D deficiency E55.9     - continue vitamin D  - check labs          Relevant Orders    Vitamin D 25-Hydroxy,Total (for eval of Vitamin D levels)

## 2023-12-13 NOTE — PATIENT INSTRUCTIONS
Kalie Galdamez ,    Thank you for coming in today. We at [facility] appreciate your trust in our care. If you have any questions or concerns about the care you received today, please do not hesitate to contact us at [phone].    The following instructions were discussed today:    - I recommend the following vaccines at the pharmacy: Tdap, Shingrix, COVID-19  - get labs for vitamin D and vitamin B12  - I will send a prescription for z-gianfranco in case you become ill on your trip  - I will send in a prescription for xanax to be used as needed for your flight

## 2023-12-13 NOTE — ASSESSMENT & PLAN NOTE
- 11/2023 LDCT --> (1) 2 mm right middle lobe parenchymal lung nodule (2) 5 mm right minor fissural nodule (3) 2 mm parenchymal lung nodule (4) 2 mm subpleural left lower lobe parenchymal lung nodule. (5) There are no suspicious parenchymal lung nodules.  - repeat LDCT 11/2024

## 2023-12-13 NOTE — ASSESSMENT & PLAN NOTE
- Declined flu vaccine.    - recommended the following vaccines at the pharmacy: Tdap, Shingrix, COVID-19

## 2024-03-11 ENCOUNTER — APPOINTMENT (OUTPATIENT)
Dept: RADIOLOGY | Facility: HOSPITAL | Age: 54
End: 2024-03-11
Payer: COMMERCIAL

## 2024-03-11 ENCOUNTER — HOSPITAL ENCOUNTER (EMERGENCY)
Facility: HOSPITAL | Age: 54
Discharge: HOME | End: 2024-03-12
Attending: EMERGENCY MEDICINE
Payer: COMMERCIAL

## 2024-03-11 DIAGNOSIS — R60.0 LEG EDEMA, LEFT: Primary | ICD-10-CM

## 2024-03-11 LAB
ALBUMIN SERPL BCP-MCNC: 4 G/DL (ref 3.4–5)
ALP SERPL-CCNC: 39 U/L (ref 33–110)
ALT SERPL W P-5'-P-CCNC: 17 U/L (ref 7–45)
ANION GAP SERPL CALC-SCNC: 14 MMOL/L (ref 10–20)
AST SERPL W P-5'-P-CCNC: 18 U/L (ref 9–39)
BASOPHILS # BLD AUTO: 0.08 X10*3/UL (ref 0–0.1)
BASOPHILS NFR BLD AUTO: 0.7 %
BILIRUB SERPL-MCNC: 0.3 MG/DL (ref 0–1.2)
BUN SERPL-MCNC: 13 MG/DL (ref 6–23)
CALCIUM SERPL-MCNC: 8.8 MG/DL (ref 8.6–10.3)
CHLORIDE SERPL-SCNC: 106 MMOL/L (ref 98–107)
CO2 SERPL-SCNC: 21 MMOL/L (ref 21–32)
CREAT SERPL-MCNC: 0.67 MG/DL (ref 0.5–1.05)
D DIMER PPP FEU-MCNC: 1020 NG/ML FEU
EGFRCR SERPLBLD CKD-EPI 2021: >90 ML/MIN/1.73M*2
EOSINOPHIL # BLD AUTO: 0.25 X10*3/UL (ref 0–0.7)
EOSINOPHIL NFR BLD AUTO: 2.3 %
ERYTHROCYTE [DISTWIDTH] IN BLOOD BY AUTOMATED COUNT: 13 % (ref 11.5–14.5)
GLUCOSE SERPL-MCNC: 113 MG/DL (ref 74–99)
HCT VFR BLD AUTO: 39.5 % (ref 36–46)
HGB BLD-MCNC: 13.1 G/DL (ref 12–16)
IMM GRANULOCYTES # BLD AUTO: 0.03 X10*3/UL (ref 0–0.7)
IMM GRANULOCYTES NFR BLD AUTO: 0.3 % (ref 0–0.9)
LYMPHOCYTES # BLD AUTO: 3.48 X10*3/UL (ref 1.2–4.8)
LYMPHOCYTES NFR BLD AUTO: 32.3 %
MCH RBC QN AUTO: 28.5 PG (ref 26–34)
MCHC RBC AUTO-ENTMCNC: 33.2 G/DL (ref 32–36)
MCV RBC AUTO: 86 FL (ref 80–100)
MONOCYTES # BLD AUTO: 0.62 X10*3/UL (ref 0.1–1)
MONOCYTES NFR BLD AUTO: 5.8 %
NEUTROPHILS # BLD AUTO: 6.3 X10*3/UL (ref 1.2–7.7)
NEUTROPHILS NFR BLD AUTO: 58.6 %
NRBC BLD-RTO: 0 /100 WBCS (ref 0–0)
PLATELET # BLD AUTO: 289 X10*3/UL (ref 150–450)
POTASSIUM SERPL-SCNC: 3.9 MMOL/L (ref 3.5–5.3)
PROT SERPL-MCNC: 7.2 G/DL (ref 6.4–8.2)
RBC # BLD AUTO: 4.59 X10*6/UL (ref 4–5.2)
SODIUM SERPL-SCNC: 137 MMOL/L (ref 136–145)
WBC # BLD AUTO: 10.8 X10*3/UL (ref 4.4–11.3)

## 2024-03-11 PROCEDURE — 85025 COMPLETE CBC W/AUTO DIFF WBC: CPT | Performed by: EMERGENCY MEDICINE

## 2024-03-11 PROCEDURE — 99284 EMERGENCY DEPT VISIT MOD MDM: CPT | Mod: 25

## 2024-03-11 PROCEDURE — 73590 X-RAY EXAM OF LOWER LEG: CPT | Mod: LT

## 2024-03-11 PROCEDURE — 93971 EXTREMITY STUDY: CPT | Performed by: RADIOLOGY

## 2024-03-11 PROCEDURE — 93971 EXTREMITY STUDY: CPT

## 2024-03-11 PROCEDURE — 85379 FIBRIN DEGRADATION QUANT: CPT | Performed by: EMERGENCY MEDICINE

## 2024-03-11 PROCEDURE — 36415 COLL VENOUS BLD VENIPUNCTURE: CPT | Performed by: EMERGENCY MEDICINE

## 2024-03-11 PROCEDURE — 80053 COMPREHEN METABOLIC PANEL: CPT | Performed by: EMERGENCY MEDICINE

## 2024-03-11 PROCEDURE — 73590 X-RAY EXAM OF LOWER LEG: CPT | Mod: LEFT SIDE | Performed by: RADIOLOGY

## 2024-03-11 ASSESSMENT — COLUMBIA-SUICIDE SEVERITY RATING SCALE - C-SSRS
1. IN THE PAST MONTH, HAVE YOU WISHED YOU WERE DEAD OR WISHED YOU COULD GO TO SLEEP AND NOT WAKE UP?: NO
6. HAVE YOU EVER DONE ANYTHING, STARTED TO DO ANYTHING, OR PREPARED TO DO ANYTHING TO END YOUR LIFE?: NO
2. HAVE YOU ACTUALLY HAD ANY THOUGHTS OF KILLING YOURSELF?: NO

## 2024-03-11 ASSESSMENT — PAIN DESCRIPTION - PAIN TYPE: TYPE: ACUTE PAIN

## 2024-03-11 ASSESSMENT — PAIN - FUNCTIONAL ASSESSMENT: PAIN_FUNCTIONAL_ASSESSMENT: 0-10

## 2024-03-11 ASSESSMENT — PAIN DESCRIPTION - LOCATION: LOCATION: LEG

## 2024-03-11 ASSESSMENT — PAIN SCALES - GENERAL: PAINLEVEL_OUTOF10: 7

## 2024-03-12 VITALS
HEART RATE: 82 BPM | TEMPERATURE: 98.2 F | HEIGHT: 62 IN | WEIGHT: 190 LBS | OXYGEN SATURATION: 99 % | DIASTOLIC BLOOD PRESSURE: 68 MMHG | SYSTOLIC BLOOD PRESSURE: 140 MMHG | RESPIRATION RATE: 14 BRPM | BODY MASS INDEX: 34.96 KG/M2

## 2024-03-12 RX ORDER — DICLOXACILLIN SODIUM 250 MG/1
500 CAPSULE ORAL ONCE
Status: DISCONTINUED | OUTPATIENT
Start: 2024-03-12 | End: 2024-03-12

## 2024-03-12 RX ORDER — DICLOXACILLIN SODIUM 500 MG/1
500 CAPSULE ORAL 4 TIMES DAILY
Qty: 20 CAPSULE | Refills: 0 | Status: SHIPPED | OUTPATIENT
Start: 2024-03-12 | End: 2024-03-17

## 2024-03-12 NOTE — ED PROVIDER NOTES
HPI   Chief Complaint   Patient presents with    Leg Swelling     Pt repots left leg swelling that began on Wednesday. Pt states that it progressed to today. Pt  states that it was red but  not as bad now. Pt reports that it hurts to walk       Patient presents with left leg swelling and redness.  It began 5 days ago which it has been improving.  The redness is decreased as well as the swelling.  She denies any trauma to the area.  She was at an urgent care and they said that she should be checked out for a deep vein thrombosis.  She has no DVT risk factors.  She has no fever or infectious type or constitutional symptoms.  She has no chest pain, shortness of breath or hemoptysis.                          [place] Coma Scale Score: 15                     Patient History   Past Medical History:   Diagnosis Date    First degree hemorrhoids 2023    Generalized hyperhidrosis 2023    Rotavirus infection 2023    Urinary frequency 2023     Past Surgical History:   Procedure Laterality Date     SECTION, LOW TRANSVERSE      ,    OTHER SURGICAL HISTORY      Endometrial ablation    OTHER SURGICAL HISTORY  2021    Tubal ligation bilateral    OTHER SURGICAL HISTORY      Cholecystectomy    OTHER SURGICAL HISTORY      Tonsillectomy    OTHER SURGICAL HISTORY  ,,    Dilation and curettage     Family History   Problem Relation Name Age of Onset    Heart failure Mother      Coronary artery disease Father      Stroke Father      Lung cancer Father       Social History     Tobacco Use    Smoking status: Former     Packs/day: 1.00     Years: 25.00     Additional pack years: 0.00     Total pack years: 25.00     Types: Cigarettes     Start date:      Quit date:      Years since quittin.2    Smokeless tobacco: Never   Vaping Use    Vaping Use: Never used   Substance Use Topics    Alcohol use: Yes     Comment: wine    Drug use: Never       Physical Exam   ED Triage  Vitals [03/11/24 1718]   Temperature Heart Rate Respirations BP   36.8 °C (98.2 °F) 92 16 171/85      Pulse Ox Temp Source Heart Rate Source Patient Position   100 % Temporal Monitor --      BP Location FiO2 (%)     -- --       Physical Exam  Vitals and nursing note reviewed.   Constitutional:       General: She is not in acute distress.     Appearance: She is well-developed.   HENT:      Head: Normocephalic and atraumatic.   Eyes:      Conjunctiva/sclera: Conjunctivae normal.   Cardiovascular:      Rate and Rhythm: Normal rate and regular rhythm.      Heart sounds: No murmur heard.  Pulmonary:      Effort: Pulmonary effort is normal. No respiratory distress.      Breath sounds: Normal breath sounds.   Abdominal:      Palpations: Abdomen is soft.      Tenderness: There is no abdominal tenderness.   Musculoskeletal:         General: No swelling.      Cervical back: Neck supple.   Skin:     General: Skin is warm and dry.      Capillary Refill: Capillary refill takes less than 2 seconds.      Comments: Very faint erythema and 5 x 5 cm region of the lower anterior leg.  There is trace edema.  The circumference of both legs is equal.  There is no lymphangitic streaking.  Lymphadenopathy.   Neurological:      Mental Status: She is alert.   Psychiatric:         Mood and Affect: Mood normal.         ED Course & MDM   Diagnoses as of 03/12/24 0054   Leg edema, left       Medical Decision Making  Differential diagnosis DVT, cellulitis, venous stasis    Patient's CBC and CMP unremarkable.  D-dimer is mildly elevated.  Venous duplex was obtained which was negative.  Will err on the side of caution treated for cellulitis.  Dicloxacillin.  She given her first dose 500 mg by mouth.  She was instructed to use compression stockings as well.  I believe the erythema is likely just from mild venous stasis.  Prior medical records reviewed.  Patient will be discharged        Procedure  Procedures     Les Cortes MD  03/12/24 0054

## 2024-03-26 ENCOUNTER — OFFICE VISIT (OUTPATIENT)
Dept: PRIMARY CARE | Facility: CLINIC | Age: 54
End: 2024-03-26
Payer: COMMERCIAL

## 2024-03-26 VITALS
SYSTOLIC BLOOD PRESSURE: 136 MMHG | HEIGHT: 62 IN | HEART RATE: 67 BPM | TEMPERATURE: 98.2 F | DIASTOLIC BLOOD PRESSURE: 83 MMHG | BODY MASS INDEX: 35.51 KG/M2 | RESPIRATION RATE: 16 BRPM | OXYGEN SATURATION: 95 % | WEIGHT: 193 LBS

## 2024-03-26 DIAGNOSIS — L03.116 CELLULITIS OF LEFT LOWER EXTREMITY: ICD-10-CM

## 2024-03-26 DIAGNOSIS — Z76.0 MEDICATION REFILL: ICD-10-CM

## 2024-03-26 DIAGNOSIS — J01.00 ACUTE NON-RECURRENT MAXILLARY SINUSITIS: Primary | ICD-10-CM

## 2024-03-26 PROCEDURE — 3079F DIAST BP 80-89 MM HG: CPT | Performed by: FAMILY MEDICINE

## 2024-03-26 PROCEDURE — 1036F TOBACCO NON-USER: CPT | Performed by: FAMILY MEDICINE

## 2024-03-26 PROCEDURE — 3008F BODY MASS INDEX DOCD: CPT | Performed by: FAMILY MEDICINE

## 2024-03-26 PROCEDURE — 3075F SYST BP GE 130 - 139MM HG: CPT | Performed by: FAMILY MEDICINE

## 2024-03-26 PROCEDURE — 99213 OFFICE O/P EST LOW 20 MIN: CPT | Performed by: FAMILY MEDICINE

## 2024-03-26 RX ORDER — ALBUTEROL SULFATE 90 UG/1
2 AEROSOL, METERED RESPIRATORY (INHALATION) EVERY 6 HOURS PRN
Qty: 54 G | Refills: 1 | Status: SHIPPED | OUTPATIENT
Start: 2024-03-26

## 2024-03-26 RX ORDER — CLINDAMYCIN HYDROCHLORIDE 300 MG/1
300 CAPSULE ORAL 3 TIMES DAILY
Qty: 30 CAPSULE | Refills: 0 | Status: SHIPPED | OUTPATIENT
Start: 2024-03-26 | End: 2024-04-05

## 2024-03-26 RX ORDER — ALBUTEROL SULFATE 90 UG/1
2 AEROSOL, METERED RESPIRATORY (INHALATION) EVERY 6 HOURS PRN
COMMUNITY
End: 2024-03-26 | Stop reason: SDUPTHER

## 2024-03-26 RX ORDER — CLINDAMYCIN HYDROCHLORIDE 300 MG/1
300 CAPSULE ORAL 3 TIMES DAILY
Qty: 30 CAPSULE | Refills: 0 | Status: SHIPPED | OUTPATIENT
Start: 2024-03-26 | End: 2024-03-26 | Stop reason: SDUPTHER

## 2024-03-26 ASSESSMENT — ENCOUNTER SYMPTOMS
NAUSEA: 0
SHORTNESS OF BREATH: 0
COUGH: 1
CONSTIPATION: 0
UNEXPECTED WEIGHT CHANGE: 0
ADENOPATHY: 0
CHILLS: 0
DIAPHORESIS: 0
DIZZINESS: 0
HEMATURIA: 0
POLYPHAGIA: 0
CONFUSION: 0
SINUS PRESSURE: 0
NUMBNESS: 0
ABDOMINAL PAIN: 0
LIGHT-HEADEDNESS: 0
HEADACHES: 0
FREQUENCY: 0
CHEST TIGHTNESS: 0
PALPITATIONS: 0
SINUS PAIN: 0
DYSPHORIC MOOD: 0
SORE THROAT: 0
DIARRHEA: 0
VOMITING: 0
FEVER: 0
WHEEZING: 0
POLYDIPSIA: 0
NERVOUS/ANXIOUS: 0
DYSURIA: 0

## 2024-03-26 ASSESSMENT — PATIENT HEALTH QUESTIONNAIRE - PHQ9
SUM OF ALL RESPONSES TO PHQ9 QUESTIONS 1 AND 2: 0
2. FEELING DOWN, DEPRESSED OR HOPELESS: NOT AT ALL
1. LITTLE INTEREST OR PLEASURE IN DOING THINGS: NOT AT ALL

## 2024-03-26 NOTE — ASSESSMENT & PLAN NOTE
- has improved  - took z-gianfranco, which would not have offered much coverage for the cellulitis   - has allergies to amoxicillin, cephalexin, doxycycline, levofloxacin, bactrim   - start clindamycin

## 2024-03-26 NOTE — PATIENT INSTRUCTIONS
Kalie Galdamez ,    Thank you for coming in today. We at [facility] appreciate your trust in our care. If you have any questions or concerns about the care you received today, please do not hesitate to contact us at [phone].    The following instructions were discussed today:    - Follow up in 3 months.

## 2024-03-26 NOTE — ASSESSMENT & PLAN NOTE
- start clindamycin. Chose this after reviewing her allergy list. Also wanted to provide coverage for cellulitis of leg

## 2024-05-08 ENCOUNTER — OFFICE VISIT (OUTPATIENT)
Dept: ORTHOPEDIC SURGERY | Facility: CLINIC | Age: 54
End: 2024-05-08
Payer: COMMERCIAL

## 2024-05-08 ENCOUNTER — HOSPITAL ENCOUNTER (OUTPATIENT)
Dept: RADIOLOGY | Facility: CLINIC | Age: 54
Discharge: HOME | End: 2024-05-08
Payer: COMMERCIAL

## 2024-05-08 VITALS — BODY MASS INDEX: 35.51 KG/M2 | HEIGHT: 62 IN | WEIGHT: 193 LBS

## 2024-05-08 DIAGNOSIS — S50.12XA CONTUSION OF ELBOW AND FOREARM, LEFT, INITIAL ENCOUNTER: ICD-10-CM

## 2024-05-08 DIAGNOSIS — S52.92XA FOREARM FRACTURE, LEFT, CLOSED, INITIAL ENCOUNTER: ICD-10-CM

## 2024-05-08 DIAGNOSIS — S63.502A WRIST SPRAIN, LEFT, INITIAL ENCOUNTER: Primary | ICD-10-CM

## 2024-05-08 DIAGNOSIS — M80.039A AGE-RELATED OSTEOPOROSIS WITH CURRENT PATHOLOGICAL FRACTURE OF FOREARM: ICD-10-CM

## 2024-05-08 PROCEDURE — 99204 OFFICE O/P NEW MOD 45 MIN: CPT | Performed by: ORTHOPAEDIC SURGERY

## 2024-05-08 PROCEDURE — 3008F BODY MASS INDEX DOCD: CPT | Performed by: ORTHOPAEDIC SURGERY

## 2024-05-08 PROCEDURE — 73090 X-RAY EXAM OF FOREARM: CPT | Mod: LEFT SIDE | Performed by: RADIOLOGY

## 2024-05-08 PROCEDURE — 73090 X-RAY EXAM OF FOREARM: CPT | Mod: LT

## 2024-05-08 NOTE — PROGRESS NOTES
C: DOI 5/6/24  Left Elbow fracture.   Patient was walking outside of the [employer] where she works, there was uneven cement and she tripped. Went down hard face first. She tried to brace her self by putting her hands forward trying to break it.   FELICITA

## 2024-05-08 NOTE — PROGRESS NOTES
53 y.o. female presents today for evaluation of left elbow and wrist pain after fall. The patient reports he tripped and fell while walking at work, she was caring a radio in her right hand and landed on her left side. The DOI is 5/6, 2 days ago. Pain is controlled. Patient reports no numbness and tingling.  Reports no previous surgeries, injections, or trauma to the area.  Reports pain worse with use, better at rest.   Pain dull ache, sharp at times. Splint has been worn as directed.       Review of Systems    Constitutional: see HPI, no fever, no chills, not feeling tired, no significant weight gain or weight loss.   Eyes: No vision changes  ENT: no nosebleeds.   Cardiovascular: no chest pain.   Respiratory: no shortness of breath and no cough.   Gastrointestinal: no abdominal pain, no nausea, no vomiting and no diarrhea.   Musculoskeletal: per HPI  Neurological: no headache, no gait disturbances  Psychiatric: no depression and no sleep disturbances.   Endocrine: no muscle weakness and no muscle cramps.   Hematologic/Lymphatic: no swollen glands and no tendency for easy bruising or excessive swelling.     Patient's past medical history, past surgical history, allergies, and medications have been reviewed unless otherwise noted in the chart.     Elbow Exam  Inspection:  no evidence of infection, no erythema, no edema, medial sided elbow ecchymosis palpation:  compartments are soft, nontender to palpation radial head, medial or lateral epicondyles, olecranon Range of Motion:  full elbow motion Stability:  no elbow instability noted, Strength:  5/5 elbow flexion/extension, Skin:  intact, Vascular:  capillary refill <2 seconds distally, Sensation:  intact to light touch distally.      Wrist Exam  Inspection:  no evidence of infection, no erythema, no edema, no ecchymosis, Palpation:  compartments are soft, no tenderness with palpation distal pole scaphoid, snuffbox, distal radius, base of the fifth metacarpal, TFCC,  Range of Motion:  full wrist and finger range of motion, Stability:  no wrist instability, Strength:  5/5 wrist and finger flexion/extension, Skin:  intact, Vascular:  capillary refill <2 seconds distally, Sensation:  sensation intact to light touch distally, Other:  no pain with palpation or motion proximally in the elbow.       Constitutional   General appearance: Alert and in no acute distress. Well developed, well nourished.    Eyes   External Eye, Conjunctiva and lids: Normal external exam - pupils were equal in size, round, reactive to light (PERRL) with normal accommodation and extraocular movements intact (EOMI).   Ears, Nose, Mouth, and Throat   Hearing: Normal.   Neck   Neck: No neck mass was observed. Supple.   Pulmonary   Respiratory effort: No respiratory distress.   Cardiovascular   Examination of extremities: No peripheral edema.   Psychiatric   Judgment and insight: Intact.   Orientation to person, place, and time: Alert and oriented x 3.       Mood and affect: Normal.      X-ray of the left forearm shows no fractures, no dislocations, lateral epicondyle enthesophyte    Left wrist sprain, left elbow contusion  Based on the history, physical exam and imaging studies above, the patient's presentation is consistent with the above diagnosis.  I had a long discussion with the patient regarding their presentation and the treatment options.  We discussed initial nonoperative versus operative management options as well as potential further diagnostic imaging.  We again discussed her treatment options going forward along with their associated risks and benefits. After thorough discussion, the patient has elected to proceed with conservative management. All questions were answered to the patients satisfaction who seems satisfied with the plan.  They will call the office with any questions/concerns.    Sling to comfort  Follow-up 2 weeks no x-ray

## 2024-05-20 ENCOUNTER — HOSPITAL ENCOUNTER (OUTPATIENT)
Dept: RADIOLOGY | Facility: HOSPITAL | Age: 54
Discharge: HOME | End: 2024-05-20
Payer: COMMERCIAL

## 2024-05-20 ENCOUNTER — OFFICE VISIT (OUTPATIENT)
Dept: ORTHOPEDIC SURGERY | Facility: CLINIC | Age: 54
End: 2024-05-20
Payer: COMMERCIAL

## 2024-05-20 DIAGNOSIS — S50.12XA CONTUSION OF ELBOW AND FOREARM, LEFT, INITIAL ENCOUNTER: ICD-10-CM

## 2024-05-20 DIAGNOSIS — S63.502A WRIST SPRAIN, LEFT, INITIAL ENCOUNTER: ICD-10-CM

## 2024-05-20 PROCEDURE — L3908 WHO COCK-UP NONMOLDE PRE OTS: HCPCS | Performed by: ORTHOPAEDIC SURGERY

## 2024-05-20 PROCEDURE — 73110 X-RAY EXAM OF WRIST: CPT | Mod: LT

## 2024-05-20 PROCEDURE — 73080 X-RAY EXAM OF ELBOW: CPT | Mod: LT

## 2024-05-20 PROCEDURE — 99214 OFFICE O/P EST MOD 30 MIN: CPT | Performed by: ORTHOPAEDIC SURGERY

## 2024-05-20 PROCEDURE — 3008F BODY MASS INDEX DOCD: CPT | Performed by: ORTHOPAEDIC SURGERY

## 2024-05-20 NOTE — PROGRESS NOTES
53 y.o. female presents today for follow-up of left elbow and wrist pain after fall. The patient reports he tripped and fell while walking at work, she was caring a radio in her right hand and landed on her left side. The DOI is 5/6, 2 weeks ago. Pain is controlled. Patient reports no numbness and tingling.  Reports no previous surgeries, injections, or trauma to the area.  Reports pain worse with use, better at rest.   Pain dull ache, sharp at times. Splint has been worn as directed.   States she continues to have pain in her left arm at the wrist and elbow.    Review of Systems    Constitutional: see HPI, no fever, no chills, not feeling tired, no significant weight gain or weight loss.   Eyes: No vision changes  ENT: no nosebleeds.   Cardiovascular: no chest pain.   Respiratory: no shortness of breath and no cough.   Gastrointestinal: no abdominal pain, no nausea, no vomiting and no diarrhea.   Musculoskeletal: per HPI  Neurological: no headache, no gait disturbances  Psychiatric: no depression and no sleep disturbances.   Endocrine: no muscle weakness and no muscle cramps.   Hematologic/Lymphatic: no swollen glands and no tendency for easy bruising or excessive swelling.     Patient's past medical history, past surgical history, allergies, and medications have been reviewed unless otherwise noted in the chart.     Elbow Exam  Inspection:  no evidence of infection, no erythema, no edema, medial sided elbow ecchymosis palpation:  compartments are soft, nontender to palpation radial head, medial or lateral epicondyles, olecranon Range of Motion:  full elbow motion Stability:  no elbow instability noted, Strength:  5/5 elbow flexion/extension, Skin:  intact, Vascular:  capillary refill <2 seconds distally, Sensation:  intact to light touch distally.      Wrist Exam  Inspection:  no evidence of infection, no erythema, no edema, no ecchymosis, Palpation:  compartments are soft, no tenderness with palpation distal pole  scaphoid, snuffbox, distal radius, base of the fifth metacarpal, TFCC, Range of Motion:  full wrist and finger range of motion, Stability:  no wrist instability, Strength:  5/5 wrist and finger flexion/extension, Skin:  intact, Vascular:  capillary refill <2 seconds distally, Sensation:  sensation intact to light touch distally, Other:  no pain with palpation or motion proximally in the elbow.       Constitutional   General appearance: Alert and in no acute distress. Well developed, well nourished.    Eyes   External Eye, Conjunctiva and lids: Normal external exam - pupils were equal in size, round, reactive to light (PERRL) with normal accommodation and extraocular movements intact (EOMI).   Ears, Nose, Mouth, and Throat   Hearing: Normal.   Neck   Neck: No neck mass was observed. Supple.   Pulmonary   Respiratory effort: No respiratory distress.   Cardiovascular   Examination of extremities: No peripheral edema.   Psychiatric   Judgment and insight: Intact.   Orientation to person, place, and time: Alert and oriented x 3.       Mood and affect: Normal.      X-ray of the left forearm shows no fractures, no dislocations, lateral epicondyle enthesophyte    Left wrist sprain, left elbow contusion  Based on the history, physical exam and imaging studies above, the patient's presentation is consistent with the above diagnosis.  I had a long discussion with the patient regarding their presentation and the treatment options.  We discussed initial nonoperative versus operative management options as well as potential further diagnostic imaging.  We again discussed her treatment options going forward along with their associated risks and benefits. After thorough discussion, the patient has elected to proceed with conservative management. All questions were answered to the patients satisfaction who seems satisfied with the plan.  They will call the office with any questions/concerns.    Cock up wrist splint  Occupational  Therapy evaluation and treatment  X-ray left wrist and elbow on the way out  Follow-up 5 weeks no x-ray

## 2024-05-20 NOTE — PROGRESS NOTES
2 week follow up Left wrist sprain, left elbow contusion   Patient states the pain and mobility is not improving

## 2024-05-30 ENCOUNTER — TELEPHONE (OUTPATIENT)
Dept: ORTHOPEDIC SURGERY | Facility: CLINIC | Age: 54
End: 2024-05-30
Payer: COMMERCIAL

## 2024-05-30 NOTE — TELEPHONE ENCOUNTER
[facility] called asking about the C9 approval they received for this patient. It is for OT and they do not do that there. Need a approved C9 for PT to get her scheduled.

## 2024-05-30 NOTE — TELEPHONE ENCOUNTER
I got a new C9 approved that say's physical / occupational therapy.   Spoke to [facility] and Ni states that she thinks that will be ok - but did not receive my fax. I will re-fax to [phone]. She states she will let me know if she doesn't receive it - or if they won't accept it.

## 2024-07-02 ENCOUNTER — APPOINTMENT (OUTPATIENT)
Dept: PRIMARY CARE | Facility: CLINIC | Age: 54
End: 2024-07-02
Payer: COMMERCIAL

## 2024-07-02 VITALS
HEIGHT: 62 IN | SYSTOLIC BLOOD PRESSURE: 140 MMHG | OXYGEN SATURATION: 100 % | WEIGHT: 199 LBS | RESPIRATION RATE: 16 BRPM | BODY MASS INDEX: 36.62 KG/M2 | HEART RATE: 76 BPM | DIASTOLIC BLOOD PRESSURE: 90 MMHG

## 2024-07-02 DIAGNOSIS — I10 ESSENTIAL HYPERTENSION: ICD-10-CM

## 2024-07-02 DIAGNOSIS — K21.9 GASTROESOPHAGEAL REFLUX DISEASE WITHOUT ESOPHAGITIS: ICD-10-CM

## 2024-07-02 DIAGNOSIS — E03.9 ADULT HYPOTHYROIDISM: ICD-10-CM

## 2024-07-02 DIAGNOSIS — F41.9 ANXIETY: ICD-10-CM

## 2024-07-02 DIAGNOSIS — M85.80 OSTEOPENIA, UNSPECIFIED LOCATION: ICD-10-CM

## 2024-07-02 DIAGNOSIS — R60.0 PEDAL EDEMA: ICD-10-CM

## 2024-07-02 DIAGNOSIS — R91.1 LUNG NODULE: ICD-10-CM

## 2024-07-02 DIAGNOSIS — Z78.0 MENOPAUSE: ICD-10-CM

## 2024-07-02 DIAGNOSIS — Z13.220 LIPID SCREENING: ICD-10-CM

## 2024-07-02 DIAGNOSIS — F32.1 MODERATE MAJOR DEPRESSION (MULTI): ICD-10-CM

## 2024-07-02 DIAGNOSIS — E55.9 VITAMIN D DEFICIENCY: ICD-10-CM

## 2024-07-02 DIAGNOSIS — J30.2 SEASONAL ALLERGIC RHINITIS, UNSPECIFIED TRIGGER: ICD-10-CM

## 2024-07-02 DIAGNOSIS — E53.8 VITAMIN B12 DEFICIENCY: ICD-10-CM

## 2024-07-02 DIAGNOSIS — K90.0 ADULT CELIAC DISEASE (HHS-HCC): Primary | ICD-10-CM

## 2024-07-02 DIAGNOSIS — E66.01 CLASS 2 SEVERE OBESITY DUE TO EXCESS CALORIES WITH SERIOUS COMORBIDITY AND BODY MASS INDEX (BMI) OF 36.0 TO 36.9 IN ADULT (MULTI): ICD-10-CM

## 2024-07-02 DIAGNOSIS — F17.211 CIGARETTE NICOTINE DEPENDENCE IN REMISSION: ICD-10-CM

## 2024-07-02 DIAGNOSIS — Z23 ENCOUNTER FOR IMMUNIZATION: ICD-10-CM

## 2024-07-02 PROBLEM — F41.1 GENERALIZED ANXIETY DISORDER: Status: RESOLVED | Noted: 2023-05-24 | Resolved: 2024-07-02

## 2024-07-02 PROBLEM — J01.00 ACUTE NON-RECURRENT MAXILLARY SINUSITIS: Status: RESOLVED | Noted: 2024-03-26 | Resolved: 2024-07-02

## 2024-07-02 PROBLEM — E66.812 CLASS 2 SEVERE OBESITY DUE TO EXCESS CALORIES WITH SERIOUS COMORBIDITY AND BODY MASS INDEX (BMI) OF 36.0 TO 36.9 IN ADULT: Status: ACTIVE | Noted: 2023-05-24

## 2024-07-02 PROBLEM — L03.116 CELLULITIS OF LEFT LOWER EXTREMITY: Status: RESOLVED | Noted: 2024-03-26 | Resolved: 2024-07-02

## 2024-07-02 PROBLEM — J01.80 OTHER ACUTE SINUSITIS: Status: RESOLVED | Noted: 2023-12-13 | Resolved: 2024-07-02

## 2024-07-02 PROCEDURE — 99214 OFFICE O/P EST MOD 30 MIN: CPT | Performed by: FAMILY MEDICINE

## 2024-07-02 PROCEDURE — 3080F DIAST BP >= 90 MM HG: CPT | Performed by: FAMILY MEDICINE

## 2024-07-02 PROCEDURE — 3008F BODY MASS INDEX DOCD: CPT | Performed by: FAMILY MEDICINE

## 2024-07-02 PROCEDURE — 3077F SYST BP >= 140 MM HG: CPT | Performed by: FAMILY MEDICINE

## 2024-07-02 PROCEDURE — 1036F TOBACCO NON-USER: CPT | Performed by: FAMILY MEDICINE

## 2024-07-02 RX ORDER — CHLORTHALIDONE 25 MG/1
25 TABLET ORAL DAILY
Qty: 30 TABLET | Refills: 5 | Status: SHIPPED | OUTPATIENT
Start: 2024-07-02 | End: 2024-12-29

## 2024-07-02 RX ORDER — ESCITALOPRAM OXALATE 10 MG/1
10 TABLET ORAL DAILY
Qty: 30 TABLET | Refills: 5 | Status: SHIPPED | OUTPATIENT
Start: 2024-07-02 | End: 2024-12-29

## 2024-07-02 RX ORDER — FLUTICASONE PROPIONATE 50 MCG
1 SPRAY, SUSPENSION (ML) NASAL
Qty: 48 G | Refills: 2 | Status: SHIPPED | OUTPATIENT
Start: 2024-07-02 | End: 2025-07-02

## 2024-07-02 RX ORDER — FAMOTIDINE 20 MG/1
20 TABLET, FILM COATED ORAL 2 TIMES DAILY PRN
Qty: 180 TABLET | Refills: 3 | Status: SHIPPED | OUTPATIENT
Start: 2024-07-02 | End: 2025-07-02

## 2024-07-02 ASSESSMENT — ENCOUNTER SYMPTOMS
COUGH: 0
VOMITING: 0
FEVER: 0
SINUS PAIN: 0
HEMATURIA: 0
DIAPHORESIS: 0
DIARRHEA: 0
CONSTIPATION: 0
ADENOPATHY: 0
CONFUSION: 0
SINUS PRESSURE: 0
CHEST TIGHTNESS: 0
LIGHT-HEADEDNESS: 0
NAUSEA: 0
POLYPHAGIA: 0
NERVOUS/ANXIOUS: 0
PALPITATIONS: 0
NUMBNESS: 0
POLYDIPSIA: 0
SHORTNESS OF BREATH: 0
SORE THROAT: 0
DYSURIA: 0
ABDOMINAL PAIN: 0
DYSPHORIC MOOD: 0
FREQUENCY: 0
WHEEZING: 0
HEADACHES: 0
CHILLS: 0
UNEXPECTED WEIGHT CHANGE: 0
DIZZINESS: 0

## 2024-07-02 ASSESSMENT — ANXIETY QUESTIONNAIRES
3. WORRYING TOO MUCH ABOUT DIFFERENT THINGS: MORE THAN HALF THE DAYS
2. NOT BEING ABLE TO STOP OR CONTROL WORRYING: MORE THAN HALF THE DAYS
4. TROUBLE RELAXING: MORE THAN HALF THE DAYS
7. FEELING AFRAID AS IF SOMETHING AWFUL MIGHT HAPPEN: SEVERAL DAYS
GAD7 TOTAL SCORE: 13
6. BECOMING EASILY ANNOYED OR IRRITABLE: NEARLY EVERY DAY
5. BEING SO RESTLESS THAT IT IS HARD TO SIT STILL: MORE THAN HALF THE DAYS
IF YOU CHECKED OFF ANY PROBLEMS ON THIS QUESTIONNAIRE, HOW DIFFICULT HAVE THESE PROBLEMS MADE IT FOR YOU TO DO YOUR WORK, TAKE CARE OF THINGS AT HOME, OR GET ALONG WITH OTHER PEOPLE: SOMEWHAT DIFFICULT
1. FEELING NERVOUS, ANXIOUS, OR ON EDGE: SEVERAL DAYS

## 2024-07-02 ASSESSMENT — PATIENT HEALTH QUESTIONNAIRE - PHQ9
10. IF YOU CHECKED OFF ANY PROBLEMS, HOW DIFFICULT HAVE THESE PROBLEMS MADE IT FOR YOU TO DO YOUR WORK, TAKE CARE OF THINGS AT HOME, OR GET ALONG WITH OTHER PEOPLE: SOMEWHAT DIFFICULT
SUM OF ALL RESPONSES TO PHQ QUESTIONS 1-9: 13
2. FEELING DOWN, DEPRESSED OR HOPELESS: SEVERAL DAYS
1. LITTLE INTEREST OR PLEASURE IN DOING THINGS: NOT AT ALL
7. TROUBLE CONCENTRATING ON THINGS, SUCH AS READING THE NEWSPAPER OR WATCHING TELEVISION: NEARLY EVERY DAY
8. MOVING OR SPEAKING SO SLOWLY THAT OTHER PEOPLE COULD HAVE NOTICED. OR THE OPPOSITE, BEING SO FIGETY OR RESTLESS THAT YOU HAVE BEEN MOVING AROUND A LOT MORE THAN USUAL: NOT AT ALL
5. POOR APPETITE OR OVEREATING: NEARLY EVERY DAY
2. FEELING DOWN, DEPRESSED OR HOPELESS: NOT AT ALL
1. LITTLE INTEREST OR PLEASURE IN DOING THINGS: SEVERAL DAYS
SUM OF ALL RESPONSES TO PHQ9 QUESTIONS 1 AND 2: 0
3. TROUBLE FALLING OR STAYING ASLEEP: MORE THAN HALF THE DAYS
SUM OF ALL RESPONSES TO PHQ9 QUESTIONS 1 & 2: 2
9. THOUGHTS THAT YOU WOULD BE BETTER OFF DEAD, OR OF HURTING YOURSELF: NOT AT ALL
6. FEELING BAD ABOUT YOURSELF - OR THAT YOU ARE A FAILURE OR HAVE LET YOURSELF OR YOUR FAMILY DOWN: SEVERAL DAYS
4. FEELING TIRED OR HAVING LITTLE ENERGY: MORE THAN HALF THE DAYS

## 2024-07-02 NOTE — PROGRESS NOTES
Subjective   Patient ID: Kalie Galdamez is a 53 y.o. female who presents for follow up (Menopausal issues/Dr. Bass usually did her thyroid medication, are you able to continue this??  /Weight loss discussion/).    HPI   routine follow up. chronic issues as per assessment and plan.     Complains of menopausal symptoms. Used to see Dr. Saldivar but he has since retired.     Blood pressure elevated today. She states it has been running. She thinks it is because of her weight gain. Has gained 25 pounds in the past year. Also states she has swelling in her lower legs at the end of the day. No pain.  She states her diet is not good. She feels hungry all the time. Is a stress eater and has had a lot of stress. She is wondering if there is a medication she can take to make her less stressed and happier. Was on zoloft in the past. States she could not take it because it worsened her anxiety. She had to go to the hospital for that.     Review of Systems   Constitutional:  Negative for chills, diaphoresis, fever and unexpected weight change.   HENT:  Negative for congestion, sinus pressure, sinus pain, sneezing and sore throat.    Respiratory:  Negative for cough, chest tightness, shortness of breath and wheezing.    Cardiovascular:  Negative for chest pain, palpitations and leg swelling.   Gastrointestinal:  Negative for abdominal pain, constipation, diarrhea, nausea and vomiting.   Endocrine: Negative for cold intolerance, heat intolerance, polydipsia, polyphagia and polyuria.   Genitourinary:  Negative for dysuria, frequency, hematuria and urgency.   Neurological:  Negative for dizziness, syncope, light-headedness, numbness and headaches.   Hematological:  Negative for adenopathy.   Psychiatric/Behavioral:  Negative for confusion and dysphoric mood. The patient is not nervous/anxious.        Objective   /90 (BP Location: Left arm, Patient Position: Sitting, BP Cuff Size: Large adult)   Pulse 76   Resp  "16   Ht 1.575 m (5' 2\")   Wt 90.3 kg (199 lb)   SpO2 100%   BMI 36.40 kg/m²     Physical Exam  Vitals and nursing note reviewed.   Constitutional:       General: She is not in acute distress.     Appearance: Normal appearance.   HENT:      Head: Normocephalic and atraumatic.      Nose: Nose normal.   Eyes:      Extraocular Movements: Extraocular movements intact.      Conjunctiva/sclera: Conjunctivae normal.      Pupils: Pupils are equal, round, and reactive to light.   Cardiovascular:      Rate and Rhythm: Normal rate and regular rhythm.      Heart sounds: No murmur heard.     No friction rub. No gallop.   Pulmonary:      Effort: Pulmonary effort is normal.      Breath sounds: Normal breath sounds. No wheezing, rhonchi or rales.   Abdominal:      General: Bowel sounds are normal. There is no distension.      Palpations: Abdomen is soft.      Tenderness: There is no abdominal tenderness.   Musculoskeletal:         General: Normal range of motion.      Cervical back: Normal range of motion and neck supple.   Skin:     General: Skin is warm and dry.   Neurological:      General: No focal deficit present.      Mental Status: She is alert and oriented to person, place, and time.      Deep Tendon Reflexes: Reflexes normal.   Psychiatric:         Mood and Affect: Mood normal.         Behavior: Behavior normal.         Thought Content: Thought content normal.         Judgment: Judgment normal.         Assessment/Plan   Problem List Items Addressed This Visit             ICD-10-CM    Adult celiac disease (HHS-HCC) - Primary K90.0     - follows with GI  - declines referral to nutrition            Relevant Orders    Comprehensive Metabolic Panel    CBC and Auto Differential    Referral to Gastroenterology    Adult hypothyroidism E03.9     - continue synthroid           Relevant Orders    Comprehensive Metabolic Panel    TSH with reflex to Free T4 if abnormal    CBC and Auto Differential    Allergic rhinitis, seasonal " J30.2     - controlled on allegra-D as needed and flonase as needed          Relevant Medications    fluticasone (Flonase) 50 mcg/actuation nasal spray    Anxiety F41.9     - start escitalopram 10 mg daily   - declines referral to counseling   - educated on slow onset of action and potential side effects.          Relevant Medications    escitalopram (Lexapro) 10 mg tablet    BMI 36.0-36.9,adult Z68.36     - Encouraged healthy lifestyle, including adequate exercise and high   - declines referral to nutrition          Relevant Orders    Comprehensive Metabolic Panel    CBC and Auto Differential    Cigarette nicotine dependence in remission F17.211     - check LDCT in 11/2024         Class 2 severe obesity due to excess calories with serious comorbidity and body mass index (BMI) of 36.0 to 36.9 in adult (Multi) E66.01, Z68.36     - Encouraged healthy lifestyle, including adequate exercise and high fiber, low fat and low carb diet.   - declines referral to nutrition          Relevant Orders    Comprehensive Metabolic Panel    CBC and Auto Differential    Encounter for immunization Z23     - recommended the following vaccines at the pharmacy: Tdap, Shingrix, COVID-19         Essential hypertension I10    Relevant Medications    chlorthalidone (Hygroton) 25 mg tablet    Other Relevant Orders    Comprehensive Metabolic Panel    CBC and Auto Differential    Gastroesophageal reflux disease without esophagitis K21.9     - controlled on famotidine as needed          Relevant Medications    famotidine (Pepcid) 20 mg tablet    Lung nodule R91.1     - 11/2023 LDCT --> (1) 2 mm right middle lobe parenchymal lung nodule (2) 5 mm right minor fissural nodule (3) 2 mm parenchymal lung nodule (4) 2 mm subpleural left lower lobe parenchymal lung nodule. (5) There are no suspicious parenchymal lung nodules.  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          Relevant Medications    escitalopram (Lexapro) 10 mg tablet    Osteopenia M85.80     - recommended calcium plus D  - recommended weight bearing exercises  - recheck DEXA 10/2025         Pedal edema R60.0     - starting chlorthalidone for blood pressure, which should help   - recommended compression stockings  - decrease salt intake  - elevate legs         Vitamin B12 deficiency E53.8     - continue vitamin B12           Relevant Orders    Vitamin B12    Vitamin D deficiency E55.9     - continue vitamin D           Relevant Orders    Vitamin D 25-Hydroxy,Total (for eval of Vitamin D levels)     Other Visit Diagnoses         Codes    Lipid screening     Z13.220    Relevant Orders    Lipid Panel

## 2024-07-02 NOTE — ASSESSMENT & PLAN NOTE
- start escitalopram 10 mg daily   - declines referral to counseling   - educated on slow onset of action and potential side effects.

## 2024-07-02 NOTE — ASSESSMENT & PLAN NOTE
- Encouraged healthy lifestyle, including adequate exercise and high fiber, low fat and low carb diet.   - declines referral to nutrition

## 2024-07-02 NOTE — PATIENT INSTRUCTIONS
Kalie Galdamez ,    Thank you for coming in today. We at [facility] appreciate your trust in our care. If you have any questions or concerns about the care you received today, please do not hesitate to contact us at [phone].    The following instructions were discussed today:    - start chlorthalidone 25 mg daily   - start lexapro (escitalopram) 10 mg daily   - get labs in 1-2 weeks. For blood work: Nothing to eat or drink for at least 10 hours prior. Okay for water or black coffee.   - refer to nutrition   - refer back to GI   - refer back to OB/GYN  - nurse visit in 2 weeks for blood pressure check  - Follow up in 1 month for virtual visit to discuss depression/ anxiety   - Follow up in 3 months.    - Please get blood work done 1-2 weeks prior to your next visit.   - I recommend the following vaccines at the pharmacy: Shingrix, Tdap, COVID-19

## 2024-07-02 NOTE — ASSESSMENT & PLAN NOTE
- Encouraged healthy lifestyle, including adequate exercise and high   - declines referral to nutrition

## 2024-07-02 NOTE — ASSESSMENT & PLAN NOTE
- starting chlorthalidone for blood pressure, which should help   - recommended compression stockings  - decrease salt intake  - elevate legs

## 2024-07-10 DIAGNOSIS — M75.02 ADHESIVE CAPSULITIS OF LEFT SHOULDER: ICD-10-CM

## 2024-07-11 ENCOUNTER — HOSPITAL ENCOUNTER (OUTPATIENT)
Dept: RADIOLOGY | Facility: HOSPITAL | Age: 54
Discharge: HOME | End: 2024-07-11
Payer: COMMERCIAL

## 2024-07-11 ENCOUNTER — APPOINTMENT (OUTPATIENT)
Dept: ORTHOPEDIC SURGERY | Facility: CLINIC | Age: 54
End: 2024-07-11
Payer: COMMERCIAL

## 2024-07-11 VITALS — WEIGHT: 199 LBS | HEIGHT: 62 IN | BODY MASS INDEX: 36.62 KG/M2

## 2024-07-11 DIAGNOSIS — M75.02 ADHESIVE CAPSULITIS OF LEFT SHOULDER: ICD-10-CM

## 2024-07-11 DIAGNOSIS — M25.819 SHOULDER IMPINGEMENT: ICD-10-CM

## 2024-07-11 DIAGNOSIS — M77.8 TENDINITIS OF LEFT SHOULDER: ICD-10-CM

## 2024-07-11 DIAGNOSIS — M75.52 BURSITIS OF LEFT SHOULDER: ICD-10-CM

## 2024-07-11 PROCEDURE — 73030 X-RAY EXAM OF SHOULDER: CPT | Mod: LT

## 2024-07-11 PROCEDURE — 73030 X-RAY EXAM OF SHOULDER: CPT | Mod: LEFT SIDE | Performed by: RADIOLOGY

## 2024-07-11 PROCEDURE — 1036F TOBACCO NON-USER: CPT | Performed by: ORTHOPAEDIC SURGERY

## 2024-07-11 PROCEDURE — 3008F BODY MASS INDEX DOCD: CPT | Performed by: ORTHOPAEDIC SURGERY

## 2024-07-11 PROCEDURE — 99214 OFFICE O/P EST MOD 30 MIN: CPT | Performed by: ORTHOPAEDIC SURGERY

## 2024-07-11 NOTE — PROGRESS NOTES
53 y.o. female presents today for follow-up of left elbow and wrist pain after fall. The patient reports she tripped and fell while walking at work, she was caring a radio in her right hand and landed on her left side. The DOI is 5/6,  2 months ago. Pain is controlled. Patient reports no numbness and tingling.  Reports no previous surgeries, injections, or trauma to the area.  Reports pain worse with use, better at rest.   Pain dull ache, sharp at times. Splint has been worn as directed.   states she has been going to therapy.  Pains improved in her left elbow and wrist.  However she continues to have shoulder pain.  She reports it feels stiff and tight at times.    Review of Systems    Constitutional: see HPI, no fever, no chills, not feeling tired, no significant weight gain or weight loss.   Eyes: No vision changes  ENT: no nosebleeds.   Cardiovascular: no chest pain.   Respiratory: no shortness of breath and no cough.   Gastrointestinal: no abdominal pain, no nausea, no vomiting and no diarrhea.   Musculoskeletal: per HPI  Neurological: no headache, no gait disturbances  Psychiatric: no depression and no sleep disturbances.   Endocrine: no muscle weakness and no muscle cramps.   Hematologic/Lymphatic: no swollen glands and no tendency for easy bruising or excessive swelling.     Patient's past medical history, past surgical history, allergies, and medications have been reviewed unless otherwise noted in the chart.     Shoulder:  Inspection:  no evidence of infection, no erythema, no edema, no ecchymosis, Palpation:  compartments are soft  Skin:  intact, Vascular:  capillary refill <2 seconds distally, Sensation:  sensation intact to light touch distally; AROM- Abduction 90, elevation to 165 degrees, ER 90 degrees, IR 90 degrees and L3;  NTTP at the biceps tendon,  TTP at AC joint; TTP on the lateral deltoid Special- painful Richter test, Neer's Test, cross body test; painful Empty can test/Drop Arm test;   negative Yergason's/Speed's Test;   painful belly pressed and posterior liftoff     Constitutional   General appearance: Alert and in no acute distress. Well developed, well nourished.    Eyes   External Eye, Conjunctiva and lids: Normal external exam - pupils were equal in size, round, reactive to light (PERRL) with normal accommodation and extraocular movements intact (EOMI).   Ears, Nose, Mouth, and Throat   Hearing: Normal.   Neck   Neck: No neck mass was observed. Supple.   Pulmonary   Respiratory effort: No respiratory distress.   Cardiovascular   Examination of extremities: No peripheral edema.   Psychiatric   Judgment and insight: Intact.   Orientation to person, place, and time: Alert and oriented x 3.       Mood and affect: Normal.      X-ray of the left forearm shows no fractures, no dislocations, lateral epicondyle enthesophyte    Left shoulder rotator cuff tendonitis, bursitis and impingement syndrome  Based on the history, physical exam and imaging studies above, the patient's presentation is consistent with the above diagnosis.  I had a long discussion with the patient regarding their presentation and the treatment options.  We discussed initial nonoperative versus operative management options as well as potential further diagnostic imaging.  We again discussed her treatment options going forward along with their associated risks and benefits. After thorough discussion, the patient has elected to proceed with conservative management. All questions were answered to the patients satisfaction who seems satisfied with the plan.  They will call the office with any questions/concerns.      Physical therapy for the left shoulder   over the counter anti-inflammatories as needed   follow-up 6 weeks

## 2024-07-11 NOTE — PROGRESS NOTES
[facility] follow up now having left shoulder pain related to her accident on 5/6  Pain is tolerable   Xrays done today

## 2024-07-17 ENCOUNTER — APPOINTMENT (OUTPATIENT)
Dept: PRIMARY CARE | Facility: CLINIC | Age: 54
End: 2024-07-17
Payer: COMMERCIAL

## 2024-07-17 VITALS — SYSTOLIC BLOOD PRESSURE: 126 MMHG | DIASTOLIC BLOOD PRESSURE: 84 MMHG | HEART RATE: 75 BPM | OXYGEN SATURATION: 99 %

## 2024-07-17 DIAGNOSIS — I10 ESSENTIAL HYPERTENSION: ICD-10-CM

## 2024-07-17 PROCEDURE — 99211 OFF/OP EST MAY X REQ PHY/QHP: CPT | Performed by: FAMILY MEDICINE

## 2024-07-17 NOTE — PROGRESS NOTES
BP CHECK PER PROVIDER, BP TODAY /84 PULSE 75, PT STATES SHE HAS A SCHEDULED FOLLOW UP WITH DR MIRZA.

## 2024-07-18 ENCOUNTER — TELEPHONE (OUTPATIENT)
Dept: OBSTETRICS AND GYNECOLOGY | Facility: CLINIC | Age: 54
End: 2024-07-18
Payer: COMMERCIAL

## 2024-07-18 DIAGNOSIS — E03.9 ADULT HYPOTHYROIDISM: ICD-10-CM

## 2024-07-18 DIAGNOSIS — K21.9 GASTROESOPHAGEAL REFLUX DISEASE WITHOUT ESOPHAGITIS: ICD-10-CM

## 2024-07-19 RX ORDER — LEVOTHYROXINE SODIUM 100 UG/1
100 TABLET ORAL DAILY
Qty: 90 TABLET | Refills: 3 | Status: SHIPPED | OUTPATIENT
Start: 2024-07-19

## 2024-07-19 RX ORDER — OMEPRAZOLE 40 MG/1
40 CAPSULE, DELAYED RELEASE ORAL
Qty: 90 CAPSULE | Refills: 1 | Status: SHIPPED | OUTPATIENT
Start: 2024-07-19

## 2024-07-19 NOTE — TELEPHONE ENCOUNTER
1) please let patient know she is to get the blood work I ordered now    2) script for levothyroxine sent.

## 2024-07-19 NOTE — TELEPHONE ENCOUNTER
Pt notified, on another note, the chlorthalidone she has been on for two weeks takes a 9am every day and has headaches and groggy when she wakes up, then by afternoon she gets very tired.  And body aches.  She states that since being on this is making her not feel right.  Should she take this before bedtime or later in the evening instead of first thing in the morning.  Has not started the Lexapro yet???

## 2024-08-02 ENCOUNTER — APPOINTMENT (OUTPATIENT)
Dept: PRIMARY CARE | Facility: CLINIC | Age: 54
End: 2024-08-02
Payer: COMMERCIAL

## 2024-08-05 ENCOUNTER — LAB (OUTPATIENT)
Dept: LAB | Facility: LAB | Age: 54
End: 2024-08-05
Payer: COMMERCIAL

## 2024-08-05 DIAGNOSIS — E03.9 ADULT HYPOTHYROIDISM: ICD-10-CM

## 2024-08-05 DIAGNOSIS — E55.9 VITAMIN D DEFICIENCY: ICD-10-CM

## 2024-08-05 DIAGNOSIS — I10 ESSENTIAL HYPERTENSION: ICD-10-CM

## 2024-08-05 DIAGNOSIS — K90.0 ADULT CELIAC DISEASE (HHS-HCC): ICD-10-CM

## 2024-08-05 DIAGNOSIS — E66.01 CLASS 2 SEVERE OBESITY DUE TO EXCESS CALORIES WITH SERIOUS COMORBIDITY AND BODY MASS INDEX (BMI) OF 36.0 TO 36.9 IN ADULT (MULTI): ICD-10-CM

## 2024-08-05 DIAGNOSIS — Z13.220 LIPID SCREENING: ICD-10-CM

## 2024-08-05 DIAGNOSIS — E53.8 VITAMIN B12 DEFICIENCY: ICD-10-CM

## 2024-08-05 LAB
25(OH)D3 SERPL-MCNC: 22 NG/ML (ref 30–100)
ALBUMIN SERPL BCP-MCNC: 4 G/DL (ref 3.4–5)
ALP SERPL-CCNC: 38 U/L (ref 33–110)
ALT SERPL W P-5'-P-CCNC: 21 U/L (ref 7–45)
ANION GAP SERPL CALC-SCNC: 9 MMOL/L (ref 10–20)
AST SERPL W P-5'-P-CCNC: 20 U/L (ref 9–39)
BASOPHILS # BLD AUTO: 0.05 X10*3/UL (ref 0–0.1)
BASOPHILS NFR BLD AUTO: 0.6 %
BILIRUB SERPL-MCNC: 0.4 MG/DL (ref 0–1.2)
BUN SERPL-MCNC: 14 MG/DL (ref 6–23)
CALCIUM SERPL-MCNC: 9.2 MG/DL (ref 8.6–10.3)
CHLORIDE SERPL-SCNC: 99 MMOL/L (ref 98–107)
CHOLEST SERPL-MCNC: 200 MG/DL (ref 0–199)
CHOLESTEROL/HDL RATIO: 3.4
CO2 SERPL-SCNC: 33 MMOL/L (ref 21–32)
CREAT SERPL-MCNC: 0.78 MG/DL (ref 0.5–1.05)
EGFRCR SERPLBLD CKD-EPI 2021: >90 ML/MIN/1.73M*2
EOSINOPHIL # BLD AUTO: 0.2 X10*3/UL (ref 0–0.7)
EOSINOPHIL NFR BLD AUTO: 2.6 %
ERYTHROCYTE [DISTWIDTH] IN BLOOD BY AUTOMATED COUNT: 13.1 % (ref 11.5–14.5)
GLUCOSE SERPL-MCNC: 75 MG/DL (ref 74–99)
HCT VFR BLD AUTO: 40.6 % (ref 36–46)
HDLC SERPL-MCNC: 59.6 MG/DL
HGB BLD-MCNC: 13.3 G/DL (ref 12–16)
IMM GRANULOCYTES # BLD AUTO: 0.04 X10*3/UL (ref 0–0.7)
IMM GRANULOCYTES NFR BLD AUTO: 0.5 % (ref 0–0.9)
LDLC SERPL CALC-MCNC: 117 MG/DL
LYMPHOCYTES # BLD AUTO: 2.35 X10*3/UL (ref 1.2–4.8)
LYMPHOCYTES NFR BLD AUTO: 30.2 %
MCH RBC QN AUTO: 28.9 PG (ref 26–34)
MCHC RBC AUTO-ENTMCNC: 32.8 G/DL (ref 32–36)
MCV RBC AUTO: 88 FL (ref 80–100)
MONOCYTES # BLD AUTO: 0.48 X10*3/UL (ref 0.1–1)
MONOCYTES NFR BLD AUTO: 6.2 %
NEUTROPHILS # BLD AUTO: 4.65 X10*3/UL (ref 1.2–7.7)
NEUTROPHILS NFR BLD AUTO: 59.9 %
NON HDL CHOLESTEROL: 140 MG/DL (ref 0–149)
NRBC BLD-RTO: 0 /100 WBCS (ref 0–0)
PLATELET # BLD AUTO: 356 X10*3/UL (ref 150–450)
POTASSIUM SERPL-SCNC: 3.6 MMOL/L (ref 3.5–5.3)
PROT SERPL-MCNC: 7 G/DL (ref 6.4–8.2)
RBC # BLD AUTO: 4.6 X10*6/UL (ref 4–5.2)
SODIUM SERPL-SCNC: 137 MMOL/L (ref 136–145)
TRIGL SERPL-MCNC: 115 MG/DL (ref 0–149)
TSH SERPL-ACNC: 1.83 MIU/L (ref 0.44–3.98)
VIT B12 SERPL-MCNC: 304 PG/ML (ref 211–911)
VLDL: 23 MG/DL (ref 0–40)
WBC # BLD AUTO: 7.8 X10*3/UL (ref 4.4–11.3)

## 2024-08-05 PROCEDURE — 82607 VITAMIN B-12: CPT

## 2024-08-05 PROCEDURE — 82306 VITAMIN D 25 HYDROXY: CPT

## 2024-08-05 PROCEDURE — 85025 COMPLETE CBC W/AUTO DIFF WBC: CPT

## 2024-08-05 PROCEDURE — 36415 COLL VENOUS BLD VENIPUNCTURE: CPT

## 2024-08-05 PROCEDURE — 80061 LIPID PANEL: CPT

## 2024-08-05 PROCEDURE — 80053 COMPREHEN METABOLIC PANEL: CPT

## 2024-08-05 PROCEDURE — 84443 ASSAY THYROID STIM HORMONE: CPT

## 2024-08-09 ENCOUNTER — APPOINTMENT (OUTPATIENT)
Dept: PRIMARY CARE | Facility: CLINIC | Age: 54
End: 2024-08-09
Payer: COMMERCIAL

## 2024-08-09 DIAGNOSIS — F41.9 ANXIETY: ICD-10-CM

## 2024-08-09 DIAGNOSIS — F32.1 MODERATE MAJOR DEPRESSION (MULTI): ICD-10-CM

## 2024-08-09 DIAGNOSIS — E55.9 VITAMIN D DEFICIENCY: ICD-10-CM

## 2024-08-09 DIAGNOSIS — I10 ESSENTIAL HYPERTENSION: Primary | ICD-10-CM

## 2024-08-09 PROCEDURE — 99442 PR PHYS/QHP TELEPHONE EVALUATION 11-20 MIN: CPT | Performed by: FAMILY MEDICINE

## 2024-08-09 PROCEDURE — 1036F TOBACCO NON-USER: CPT | Performed by: FAMILY MEDICINE

## 2024-08-09 RX ORDER — ACETAMINOPHEN 500 MG
2000 TABLET ORAL DAILY
Qty: 90 CAPSULE | Refills: 3 | Status: SHIPPED | OUTPATIENT
Start: 2024-08-09 | End: 2025-08-09

## 2024-08-09 ASSESSMENT — ENCOUNTER SYMPTOMS
WHEEZING: 0
NERVOUS/ANXIOUS: 0
VOMITING: 0
POLYPHAGIA: 0
FEVER: 0
SHORTNESS OF BREATH: 0
HEADACHES: 0
CONSTIPATION: 0
HEMATURIA: 0
ABDOMINAL PAIN: 0
COUGH: 0
PALPITATIONS: 0
DYSURIA: 0
ADENOPATHY: 0
UNEXPECTED WEIGHT CHANGE: 0
CHILLS: 0
CHEST TIGHTNESS: 0
LIGHT-HEADEDNESS: 0
SINUS PAIN: 0
NUMBNESS: 0
FREQUENCY: 0
DIARRHEA: 0
SINUS PRESSURE: 0
POLYDIPSIA: 0
CONFUSION: 0
DYSPHORIC MOOD: 0
NAUSEA: 0
DIZZINESS: 0
SORE THROAT: 0
DIAPHORESIS: 0

## 2024-08-09 NOTE — PATIENT INSTRUCTIONS
Kalie Galdamez ,    Thank you for coming in today. We at [facility] appreciate your trust in our care. If you have any questions or concerns about the care you received today, please do not hesitate to contact us at [phone].    The following instructions were discussed today:   - start escitalopram 10 mg daily    - start vitamin D 2000 IU daily   - recheck vitamin D lab in 3 months   - Follow up 10/7/2024 as scheduled.

## 2024-08-09 NOTE — ASSESSMENT & PLAN NOTE
- started escitalopram 10 mg daily at last visit but she never started. Recommended she do so.   - declines referral to counseling   - educated on slow onset of action and potential side effects.

## 2024-08-09 NOTE — PROGRESS NOTES
Subjective   Patient ID: Kalie Galdamez is a 53 y.o. female who presents for Follow-up.    Virtual or Telephone Consent    A telephone visit (audio only) between the patient (at the originating site) and the provider (at the distant site) was utilized to provide this telehealth service.   Verbal consent was requested and obtained from Kalie Galdamez on this date, 08/09/24 for a telehealth visit.      HPI  Follow up hypertension. Started chlorthalidone at last visit. Came in for blood pressure check on 7/17/24 and blood pressure was 126/84.     Follow up depression/ anxiety. Started escitalopram at last visit. She states she has not tried it yet.     Also here to review labs. Results as below.     Lab on 08/05/2024   Component Date Value Ref Range Status    Vitamin D, 25-Hydroxy, Total 08/05/2024 22 (L)  30 - 100 ng/mL Final    Vitamin B12 08/05/2024 304  211 - 911 pg/mL Final    Glucose 08/05/2024 75  74 - 99 mg/dL Final    Sodium 08/05/2024 137  136 - 145 mmol/L Final    Potassium 08/05/2024 3.6  3.5 - 5.3 mmol/L Final    Chloride 08/05/2024 99  98 - 107 mmol/L Final    Bicarbonate 08/05/2024 33 (H)  21 - 32 mmol/L Final    Anion Gap 08/05/2024 9 (L)  10 - 20 mmol/L Final    Urea Nitrogen 08/05/2024 14  6 - 23 mg/dL Final    Creatinine 08/05/2024 0.78  0.50 - 1.05 mg/dL Final    eGFR 08/05/2024 >90  >60 mL/min/1.73m*2 Final    Calculations of estimated GFR are performed using the 2021 CKD-EPI Study Refit equation without the race variable for the IDMS-Traceable creatinine methods.  https://jasn.asnjournals.org/content/early/2021/09/22/ASN.2810667225    Calcium 08/05/2024 9.2  8.6 - 10.3 mg/dL Final    Albumin 08/05/2024 4.0  3.4 - 5.0 g/dL Final    Alkaline Phosphatase 08/05/2024 38  33 - 110 U/L Final    Total Protein 08/05/2024 7.0  6.4 - 8.2 g/dL Final    AST 08/05/2024 20  9 - 39 U/L Final    Bilirubin, Total 08/05/2024 0.4  0.0 - 1.2 mg/dL Final    ALT 08/05/2024 21  7 - 45 U/L Final    Patients treated  with Sulfasalazine may generate falsely decreased results for ALT.    Cholesterol 08/05/2024 200 (H)  0 - 199 mg/dL Final          Age      Desirable   Borderline High   High     0-19 Y     0 - 169       170 - 199     >/= 200    20-24 Y     0 - 189       190 - 224     >/= 225         >24 Y     0 - 199       200 - 239     >/= 240   **All ranges are based on fasting samples. Specific   therapeutic targets will vary based on patient-specific   cardiac risk.    Pediatric guidelines reference:Pediatrics 2011, 128(S5).Adult guidelines reference: NCEP ATPIII Guidelines,GREGORY 2001, 258:2486-97    Venipuncture immediately after or during the administration of Metamizole may lead to falsely low results. Testing should be performed immediately prior to Metamizole dosing.    HDL-Cholesterol 08/05/2024 59.6  mg/dL Final      Age       Very Low   Low     Normal    High    0-19 Y    < 35      < 40     40-45     ----  20-24 Y    ----     < 40      >45      ----        >24 Y      ----     < 40     40-60      >60      Cholesterol/HDL Ratio 08/05/2024 3.4   Final      Ref Values  Desirable  < 3.4  High Risk  > 5.0    LDL Calculated 08/05/2024 117 (H)  <=99 mg/dL Final                                Near   Borderline      AGE      Desirable  Optimal    High     High     Very High     0-19 Y     0 - 109     ---    110-129   >/= 130     ----    20-24 Y     0 - 119     ---    120-159   >/= 160     ----      >24 Y     0 -  99   100-129  130-159   160-189     >/=190      VLDL 08/05/2024 23  0 - 40 mg/dL Final    Triglycerides 08/05/2024 115  0 - 149 mg/dL Final       Age         Desirable   Borderline High   High     Very High   0 D-90 D    19 - 174         ----         ----        ----  91 D- 9 Y     0 -  74        75 -  99     >/= 100      ----    10-19 Y     0 -  89        90 - 129     >/= 130      ----    20-24 Y     0 - 114       115 - 149     >/= 150      ----         >24 Y     0 - 149       150 - 199    200- 499    >/=  500    Venipuncture immediately after or during the administration of Metamizole may lead to falsely low results. Testing should be performed immediately prior to Metamizole dosing.    Non HDL Cholesterol 08/05/2024 140  0 - 149 mg/dL Final          Age       Desirable   Borderline High   High     Very High     0-19 Y     0 - 119       120 - 144     >/= 145    >/= 160    20-24 Y     0 - 149       150 - 189     >/= 190      ----         >24 Y    30 mg/dL above LDL Cholesterol goal      Thyroid Stimulating Hormone 08/05/2024 1.83  0.44 - 3.98 mIU/L Final    WBC 08/05/2024 7.8  4.4 - 11.3 x10*3/uL Final    nRBC 08/05/2024 0.0  0.0 - 0.0 /100 WBCs Final    RBC 08/05/2024 4.60  4.00 - 5.20 x10*6/uL Final    Hemoglobin 08/05/2024 13.3  12.0 - 16.0 g/dL Final    Hematocrit 08/05/2024 40.6  36.0 - 46.0 % Final    MCV 08/05/2024 88  80 - 100 fL Final    MCH 08/05/2024 28.9  26.0 - 34.0 pg Final    MCHC 08/05/2024 32.8  32.0 - 36.0 g/dL Final    RDW 08/05/2024 13.1  11.5 - 14.5 % Final    Platelets 08/05/2024 356  150 - 450 x10*3/uL Final    Neutrophils % 08/05/2024 59.9  40.0 - 80.0 % Final    Immature Granulocytes %, Automated 08/05/2024 0.5  0.0 - 0.9 % Final    Immature Granulocyte Count (IG) includes promyelocytes, myelocytes and metamyelocytes but does not include bands. Percent differential counts (%) should be interpreted in the context of the absolute cell counts (cells/UL).    Lymphocytes % 08/05/2024 30.2  13.0 - 44.0 % Final    Monocytes % 08/05/2024 6.2  2.0 - 10.0 % Final    Eosinophils % 08/05/2024 2.6  0.0 - 6.0 % Final    Basophils % 08/05/2024 0.6  0.0 - 2.0 % Final    Neutrophils Absolute 08/05/2024 4.65  1.20 - 7.70 x10*3/uL Final    Percent differential counts (%) should be interpreted in the context of the absolute cell counts (cells/uL).    Immature Granulocytes Absolute, Au* 08/05/2024 0.04  0.00 - 0.70 x10*3/uL Final    Lymphocytes Absolute 08/05/2024 2.35  1.20 - 4.80 x10*3/uL Final    Monocytes  Absolute 08/05/2024 0.48  0.10 - 1.00 x10*3/uL Final    Eosinophils Absolute 08/05/2024 0.20  0.00 - 0.70 x10*3/uL Final    Basophils Absolute 08/05/2024 0.05  0.00 - 0.10 x10*3/uL Final        Review of Systems   Constitutional:  Negative for chills, diaphoresis, fever and unexpected weight change.   HENT:  Negative for congestion, sinus pressure, sinus pain, sneezing and sore throat.    Respiratory:  Negative for cough, chest tightness, shortness of breath and wheezing.    Cardiovascular:  Negative for chest pain, palpitations and leg swelling.   Gastrointestinal:  Negative for abdominal pain, constipation, diarrhea, nausea and vomiting.   Endocrine: Negative for cold intolerance, heat intolerance, polydipsia, polyphagia and polyuria.   Genitourinary:  Negative for dysuria, frequency, hematuria and urgency.   Neurological:  Negative for dizziness, syncope, light-headedness, numbness and headaches.   Hematological:  Negative for adenopathy.   Psychiatric/Behavioral:  Negative for confusion and dysphoric mood. The patient is not nervous/anxious.          Assessment/Plan   Problem List Items Addressed This Visit       Anxiety     - started escitalopram 10 mg daily at last visit but she never started. Recommended she do so.   - declines referral to counseling   - educated on slow onset of action and potential side effects.          Essential hypertension - Primary     - controlled. Continue chlorthalidone         Moderate major depression (Multi)     - started escitalopram 10 mg daily at last visit but she never started. Recommended she do so.   - declines referral to counseling   - educated on slow onset of action and potential side effects.          Vitamin D deficiency     - start vitamin D 2000 IU daily   - recheck labs in 3 months          Relevant Medications    cholecalciferol (Vitamin D3) 50 mcg (2,000 unit) capsule    Other Relevant Orders    Vitamin D 25-Hydroxy,Total (for eval of Vitamin D levels)        This telephone visit lasted approximately 12 minutes.

## 2024-10-07 ENCOUNTER — APPOINTMENT (OUTPATIENT)
Dept: PRIMARY CARE | Facility: CLINIC | Age: 54
End: 2024-10-07
Payer: COMMERCIAL

## 2024-10-22 ENCOUNTER — APPOINTMENT (OUTPATIENT)
Dept: OBSTETRICS AND GYNECOLOGY | Facility: CLINIC | Age: 54
End: 2024-10-22
Payer: COMMERCIAL

## 2024-10-22 VITALS — DIASTOLIC BLOOD PRESSURE: 100 MMHG | WEIGHT: 204 LBS | SYSTOLIC BLOOD PRESSURE: 142 MMHG | BODY MASS INDEX: 37.31 KG/M2

## 2024-10-22 DIAGNOSIS — R03.0 ELEVATED BLOOD PRESSURE READING: ICD-10-CM

## 2024-10-22 DIAGNOSIS — E03.9 ADULT HYPOTHYROIDISM: ICD-10-CM

## 2024-10-22 DIAGNOSIS — R63.5 WEIGHT GAIN: ICD-10-CM

## 2024-10-22 DIAGNOSIS — Z12.31 SCREENING MAMMOGRAM FOR BREAST CANCER: ICD-10-CM

## 2024-10-22 DIAGNOSIS — R68.82 DECREASED LIBIDO: ICD-10-CM

## 2024-10-22 DIAGNOSIS — Z01.411 ENCOUNTER FOR GYNECOLOGICAL EXAMINATION WITH ABNORMAL FINDING: Primary | ICD-10-CM

## 2024-10-22 PROCEDURE — 99213 OFFICE O/P EST LOW 20 MIN: CPT | Performed by: NURSE PRACTITIONER

## 2024-10-22 PROCEDURE — 3077F SYST BP >= 140 MM HG: CPT | Performed by: NURSE PRACTITIONER

## 2024-10-22 PROCEDURE — 99396 PREV VISIT EST AGE 40-64: CPT | Performed by: NURSE PRACTITIONER

## 2024-10-22 PROCEDURE — 3080F DIAST BP >= 90 MM HG: CPT | Performed by: NURSE PRACTITIONER

## 2024-10-22 SDOH — ECONOMIC STABILITY: TRANSPORTATION INSECURITY
IN THE PAST 12 MONTHS, HAS THE LACK OF TRANSPORTATION KEPT YOU FROM MEDICAL APPOINTMENTS OR FROM GETTING MEDICATIONS?: NO

## 2024-10-22 SDOH — ECONOMIC STABILITY: FOOD INSECURITY: WITHIN THE PAST 12 MONTHS, THE FOOD YOU BOUGHT JUST DIDN'T LAST AND YOU DIDN'T HAVE MONEY TO GET MORE.: NEVER TRUE

## 2024-10-22 SDOH — ECONOMIC STABILITY: FOOD INSECURITY: WITHIN THE PAST 12 MONTHS, YOU WORRIED THAT YOUR FOOD WOULD RUN OUT BEFORE YOU GOT MONEY TO BUY MORE.: NEVER TRUE

## 2024-10-22 SDOH — ECONOMIC STABILITY: TRANSPORTATION INSECURITY
IN THE PAST 12 MONTHS, HAS LACK OF TRANSPORTATION KEPT YOU FROM MEETINGS, WORK, OR FROM GETTING THINGS NEEDED FOR DAILY LIVING?: NO

## 2024-10-22 NOTE — PROGRESS NOTES
Previous Yariel pt, new to me  Annual with problem visit of decreased libido and increased fatigue     Subjective   Kalie Galdamez is a 53 y.o. female who is here for a routine exam. Periods are absent, pt is not menopausal. S/P endometrial ablation and BTL. No intermenstrual bleeding, spotting, or discharge.  PCP managing hypothyroid  Hx of DANG managed by URO-GYN  Hx of osteopenia, last DEXA  WNL  Last mammogram  WNL   Colonoscopy:  WNL     Pt states she has no ambition to do anything, pt states she has had a lot of situational concerns. Pt states she has had decreased libido over the past year.     History of abnormal Pap smear: no  Family history of uterine or ovarian cancer: no  Regular self breast exam: yes    History of abnormal mammogram: no  Family history of breast cancer: yes - sister with breast cancer 60    Last pap:  WNL HPV negative, due      Review of Systems:    Constitutional: Negative.    HENT: Negative.     Eyes: Negative.    Respiratory: Negative.     Cardiovascular: Negative.    Gastrointestinal: Negative.    Endocrine: Negative.    Genitourinary: Negative.    Musculoskeletal: Negative.    Skin: Negative.    Allergic/Immunologic: Negative.    Neurological: Negative.    Hematological: Negative.    Psychiatric/Behavioral: Negative.       Past Medical History:   Diagnosis Date    First degree hemorrhoids 2023    Generalized hyperhidrosis 2023    Rotavirus infection 2023    Urinary frequency 2023      Past Surgical History:   Procedure Laterality Date     SECTION, LOW TRANSVERSE      ,    OTHER SURGICAL HISTORY      Endometrial ablation    OTHER SURGICAL HISTORY  2021    Tubal ligation bilateral    OTHER SURGICAL HISTORY      Cholecystectomy    OTHER SURGICAL HISTORY      Tonsillectomy    OTHER SURGICAL HISTORY  ,,    Dilation and curettage      Menstrual History:  OB History          2    Para   2     Term   2            AB        Living             SAB        IAB        Ectopic        Multiple        Live Births                      No LMP recorded. Patient is postmenopausal.         Review of Systems    Objective   BP (!) 142/100   Wt 92.5 kg (204 lb)   BMI 37.31 kg/m²     Exam:   Constitutional; alert with no acute distress.  Well-nourished.      Neck: No asymmetry noted.  Thyroid without enlargement.  No palpable nodules or masses of concern.    Cardiovascular: Heart regular rate and rhythm, normal S1 and S2    Pulmonary: No respiratory distress, lungs clear to auscultate bilaterally    Chest/breast exam: Appearance bilaterally normal, without asymmetry of concern, without skin lesions or nipple discharge.  Palpation of the breast-no palpable masses no lymphadenopathy of the axilla.    Abdomen: Soft, nontender, no abdominal masses palpated    Genitourinary:  Palpation of the lymph nodes of the groin-no inguinal lymphadenopathy  External genitalia/perianal: Without lesions normal in appearance   Urethra: In appearance without lesions Bladder: non-tender to palpate  Vagina: Without lesions including Bartholin, urethra, Huslia's glands within normal limits all WNL   Cervix: Normal in appearance without lesions, no cervical motion tenderness to palpation  Uterus: Without enlargement, mobile, nontender to palpate  Bilateral adnexa:  without masses, nontender to palpate    Skin: Normal skin color and pigmentation    Psychiatric: Alert and oriented x3. Affect normal to patient baseline.  Mood: appropriate       Assessment/Plan   Diagnoses and all orders for this visit:  Encounter for gynecological examination with abnormal finding  Screening mammogram for breast cancer  -     BI mammo bilateral screening tomosynthesis; Future  Adult hypothyroidism  Decreased libido  -     Insulin, Fasting; Future  -     Hemoglobin A1C; Future  -     Glucose, Fasting; Future  Weight gain  -     Insulin, Fasting; Future  -      Hemoglobin A1C; Future  -     Glucose, Fasting; Future  Elevated blood pressure reading      No follow-ups on file.     Pap plan: WNL HX, plan co-testing every 5 years   STI screening annually and prn if needed  Contraception discussed  Safe sex discussed   RTO 1 year annual exam, prn   Mammogram screening evaluation     Decreased libido:  TSH WNL  Discussed Lexapro and menopause   Offered fasting insulin and glucose testing, pt will consider  Sister with Breast cancer and concerns for HRT.   Increased exercise and a healthy diet recommended.   Pt offered counseling as there are many life stresses are impacting pt's self care.   Brief discussion about HRT R/B reviewed.  Pt to consider all of her options at this time and can follow up after labs.       Velvet Brenner, APRN-CNM, APRN-CNP

## 2024-11-13 ENCOUNTER — HOSPITAL ENCOUNTER (OUTPATIENT)
Dept: RADIOLOGY | Facility: HOSPITAL | Age: 54
Discharge: HOME | End: 2024-11-13
Payer: COMMERCIAL

## 2024-11-13 VITALS — WEIGHT: 200 LBS | BODY MASS INDEX: 35.44 KG/M2 | HEIGHT: 63 IN

## 2024-11-13 DIAGNOSIS — Z12.31 SCREENING MAMMOGRAM FOR BREAST CANCER: ICD-10-CM

## 2024-11-13 PROCEDURE — 77063 BREAST TOMOSYNTHESIS BI: CPT | Performed by: RADIOLOGY

## 2024-11-13 PROCEDURE — 77067 SCR MAMMO BI INCL CAD: CPT | Performed by: RADIOLOGY

## 2024-11-13 PROCEDURE — 77067 SCR MAMMO BI INCL CAD: CPT

## 2024-12-30 ENCOUNTER — LAB (OUTPATIENT)
Dept: LAB | Facility: LAB | Age: 54
End: 2024-12-30
Payer: COMMERCIAL

## 2024-12-30 DIAGNOSIS — R68.82 DECREASED LIBIDO: ICD-10-CM

## 2024-12-30 DIAGNOSIS — R63.5 WEIGHT GAIN: ICD-10-CM

## 2024-12-30 DIAGNOSIS — E55.9 VITAMIN D DEFICIENCY: ICD-10-CM

## 2024-12-30 LAB
25(OH)D3 SERPL-MCNC: 25 NG/ML (ref 30–100)
EST. AVERAGE GLUCOSE BLD GHB EST-MCNC: 111 MG/DL
GLUCOSE P FAST SERPL-MCNC: 89 MG/DL (ref 74–99)
HBA1C MFR BLD: 5.5 %
INSULIN P FAST SERPL-ACNC: 20 UIU/ML (ref 3–25)

## 2024-12-30 PROCEDURE — 83525 ASSAY OF INSULIN: CPT

## 2024-12-30 PROCEDURE — 82306 VITAMIN D 25 HYDROXY: CPT

## 2024-12-30 PROCEDURE — 82947 ASSAY GLUCOSE BLOOD QUANT: CPT

## 2024-12-30 PROCEDURE — 83036 HEMOGLOBIN GLYCOSYLATED A1C: CPT

## 2025-01-01 DIAGNOSIS — I10 ESSENTIAL HYPERTENSION: ICD-10-CM

## 2025-01-01 NOTE — TELEPHONE ENCOUNTER
1) please let patient know  vitamin D is still low. Has she been taking vitamin D? How much?     2) she is also overdue for routine visit with me. Please get her scheduled.

## 2025-01-02 RX ORDER — CHLORTHALIDONE 25 MG/1
25 TABLET ORAL DAILY
Qty: 90 TABLET | Refills: 1 | Status: SHIPPED | OUTPATIENT
Start: 2025-01-02 | End: 2025-07-01

## 2025-01-12 DIAGNOSIS — I10 ESSENTIAL HYPERTENSION: ICD-10-CM

## 2025-01-13 RX ORDER — CHLORTHALIDONE 25 MG/1
25 TABLET ORAL DAILY
Qty: 30 TABLET | Refills: 5 | OUTPATIENT
Start: 2025-01-13

## 2025-03-05 ENCOUNTER — TELEPHONE (OUTPATIENT)
Dept: PRIMARY CARE | Facility: CLINIC | Age: 55
End: 2025-03-05
Payer: COMMERCIAL

## 2025-03-05 DIAGNOSIS — F17.211 CIGARETTE NICOTINE DEPENDENCE IN REMISSION: Primary | ICD-10-CM

## 2025-03-05 NOTE — TELEPHONE ENCOUNTER
----- Message from Nurse Seble BAUM sent at 3/5/2025  8:04 AM EST -----  Regarding: Lung cancer screening annual exam  Good Morning Dr. Nugent,         I am reaching out to obtain an order for the patients annual lung cancer screening exam. Would you please place an order for an [id number]? Thank you in advance.  Thank  you,  Seble SOTOMAYOR  [phone]

## 2025-04-14 ENCOUNTER — APPOINTMENT (OUTPATIENT)
Dept: PRIMARY CARE | Facility: CLINIC | Age: 55
End: 2025-04-14
Payer: COMMERCIAL

## 2025-04-14 VITALS
DIASTOLIC BLOOD PRESSURE: 80 MMHG | WEIGHT: 204 LBS | RESPIRATION RATE: 16 BRPM | HEIGHT: 63 IN | HEART RATE: 87 BPM | OXYGEN SATURATION: 95 % | BODY MASS INDEX: 36.14 KG/M2 | SYSTOLIC BLOOD PRESSURE: 118 MMHG

## 2025-04-14 DIAGNOSIS — Z12.11 COLON CANCER SCREENING: ICD-10-CM

## 2025-04-14 DIAGNOSIS — E53.8 VITAMIN B12 DEFICIENCY: ICD-10-CM

## 2025-04-14 DIAGNOSIS — M85.80 OSTEOPENIA, UNSPECIFIED LOCATION: ICD-10-CM

## 2025-04-14 DIAGNOSIS — E55.9 VITAMIN D DEFICIENCY: ICD-10-CM

## 2025-04-14 DIAGNOSIS — F32.1 MODERATE MAJOR DEPRESSION (MULTI): ICD-10-CM

## 2025-04-14 DIAGNOSIS — K21.9 GASTROESOPHAGEAL REFLUX DISEASE WITHOUT ESOPHAGITIS: ICD-10-CM

## 2025-04-14 DIAGNOSIS — Z12.31 VISIT FOR SCREENING MAMMOGRAM: ICD-10-CM

## 2025-04-14 DIAGNOSIS — E03.9 ADULT HYPOTHYROIDISM: ICD-10-CM

## 2025-04-14 DIAGNOSIS — Z12.4 PAP SMEAR FOR CERVICAL CANCER SCREENING: ICD-10-CM

## 2025-04-14 DIAGNOSIS — F17.211 CIGARETTE NICOTINE DEPENDENCE IN REMISSION: ICD-10-CM

## 2025-04-14 DIAGNOSIS — J30.2 SEASONAL ALLERGIC RHINITIS, UNSPECIFIED TRIGGER: ICD-10-CM

## 2025-04-14 DIAGNOSIS — E66.01 CLASS 2 SEVERE OBESITY DUE TO EXCESS CALORIES WITH SERIOUS COMORBIDITY AND BODY MASS INDEX (BMI) OF 36.0 TO 36.9 IN ADULT: ICD-10-CM

## 2025-04-14 DIAGNOSIS — F41.9 ANXIETY: ICD-10-CM

## 2025-04-14 DIAGNOSIS — E66.812 CLASS 2 SEVERE OBESITY DUE TO EXCESS CALORIES WITH SERIOUS COMORBIDITY AND BODY MASS INDEX (BMI) OF 36.0 TO 36.9 IN ADULT: ICD-10-CM

## 2025-04-14 DIAGNOSIS — R41.840 INATTENTION: ICD-10-CM

## 2025-04-14 DIAGNOSIS — R73.9 HYPERGLYCEMIA: ICD-10-CM

## 2025-04-14 DIAGNOSIS — R51.9 PRESSURE IN HEAD: ICD-10-CM

## 2025-04-14 DIAGNOSIS — Z80.9 FAMILY HISTORY OF CANCER: ICD-10-CM

## 2025-04-14 DIAGNOSIS — K90.0 ADULT CELIAC DISEASE (HHS-HCC): ICD-10-CM

## 2025-04-14 DIAGNOSIS — R91.1 LUNG NODULE: ICD-10-CM

## 2025-04-14 DIAGNOSIS — Z23 ENCOUNTER FOR IMMUNIZATION: ICD-10-CM

## 2025-04-14 DIAGNOSIS — Z13.220 LIPID SCREENING: ICD-10-CM

## 2025-04-14 DIAGNOSIS — I10 ESSENTIAL HYPERTENSION: Primary | ICD-10-CM

## 2025-04-14 PROCEDURE — 99214 OFFICE O/P EST MOD 30 MIN: CPT | Performed by: FAMILY MEDICINE

## 2025-04-14 PROCEDURE — 3008F BODY MASS INDEX DOCD: CPT | Performed by: FAMILY MEDICINE

## 2025-04-14 PROCEDURE — 3074F SYST BP LT 130 MM HG: CPT | Performed by: FAMILY MEDICINE

## 2025-04-14 PROCEDURE — 3079F DIAST BP 80-89 MM HG: CPT | Performed by: FAMILY MEDICINE

## 2025-04-14 PROCEDURE — 1036F TOBACCO NON-USER: CPT | Performed by: FAMILY MEDICINE

## 2025-04-14 RX ORDER — LEVOTHYROXINE SODIUM 100 UG/1
100 TABLET ORAL DAILY
Qty: 90 TABLET | Refills: 3 | Status: SHIPPED | OUTPATIENT
Start: 2025-04-14

## 2025-04-14 RX ORDER — CHLORTHALIDONE 25 MG/1
25 TABLET ORAL DAILY
Qty: 90 TABLET | Refills: 3 | Status: SHIPPED | OUTPATIENT
Start: 2025-04-14 | End: 2026-04-14

## 2025-04-14 RX ORDER — OMEPRAZOLE 40 MG/1
40 CAPSULE, DELAYED RELEASE ORAL
Qty: 90 CAPSULE | Refills: 3 | Status: SHIPPED | OUTPATIENT
Start: 2025-04-14 | End: 2026-04-14

## 2025-04-14 RX ORDER — FAMOTIDINE 20 MG/1
20 TABLET, FILM COATED ORAL 2 TIMES DAILY PRN
Qty: 180 TABLET | Refills: 3 | Status: SHIPPED | OUTPATIENT
Start: 2025-04-14 | End: 2026-04-14

## 2025-04-14 RX ORDER — FLUTICASONE PROPIONATE 50 MCG
1 SPRAY, SUSPENSION (ML) NASAL
Qty: 48 G | Refills: 3 | Status: SHIPPED | OUTPATIENT
Start: 2025-04-14 | End: 2026-04-14

## 2025-04-14 ASSESSMENT — ENCOUNTER SYMPTOMS
HEADACHES: 0
CHEST TIGHTNESS: 0
NAUSEA: 0
VOMITING: 0
DIARRHEA: 0
COUGH: 0
DYSPHORIC MOOD: 0
SINUS PAIN: 0
ADENOPATHY: 0
DIZZINESS: 0
DIAPHORESIS: 0
LIGHT-HEADEDNESS: 0
CHILLS: 0
DYSURIA: 0
FREQUENCY: 0
SHORTNESS OF BREATH: 0
ABDOMINAL PAIN: 0
NERVOUS/ANXIOUS: 0
SINUS PRESSURE: 0
NUMBNESS: 0
CONSTIPATION: 0
HEMATURIA: 0
SORE THROAT: 0
WHEEZING: 0
POLYPHAGIA: 0
CONFUSION: 0
PALPITATIONS: 0
POLYDIPSIA: 0
FEVER: 0
UNEXPECTED WEIGHT CHANGE: 0

## 2025-04-14 ASSESSMENT — PATIENT HEALTH QUESTIONNAIRE - PHQ9
7. TROUBLE CONCENTRATING ON THINGS, SUCH AS READING THE NEWSPAPER OR WATCHING TELEVISION: NOT AT ALL
10. IF YOU CHECKED OFF ANY PROBLEMS, HOW DIFFICULT HAVE THESE PROBLEMS MADE IT FOR YOU TO DO YOUR WORK, TAKE CARE OF THINGS AT HOME, OR GET ALONG WITH OTHER PEOPLE: SOMEWHAT DIFFICULT
1. LITTLE INTEREST OR PLEASURE IN DOING THINGS: NOT AT ALL
2. FEELING DOWN, DEPRESSED OR HOPELESS: NOT AT ALL
5. POOR APPETITE OR OVEREATING: NOT AT ALL
SUM OF ALL RESPONSES TO PHQ9 QUESTIONS 1 & 2: 0
9. THOUGHTS THAT YOU WOULD BE BETTER OFF DEAD, OR OF HURTING YOURSELF: NOT AT ALL
8. MOVING OR SPEAKING SO SLOWLY THAT OTHER PEOPLE COULD HAVE NOTICED. OR THE OPPOSITE, BEING SO FIGETY OR RESTLESS THAT YOU HAVE BEEN MOVING AROUND A LOT MORE THAN USUAL: NOT AT ALL
6. FEELING BAD ABOUT YOURSELF - OR THAT YOU ARE A FAILURE OR HAVE LET YOURSELF OR YOUR FAMILY DOWN: NOT AT ALL
SUM OF ALL RESPONSES TO PHQ QUESTIONS 1-9: 3
4. FEELING TIRED OR HAVING LITTLE ENERGY: NEARLY EVERY DAY
3. TROUBLE FALLING OR STAYING ASLEEP: NOT AT ALL

## 2025-04-14 ASSESSMENT — ANXIETY QUESTIONNAIRES
1. FEELING NERVOUS, ANXIOUS, OR ON EDGE: SEVERAL DAYS
IF YOU CHECKED OFF ANY PROBLEMS ON THIS QUESTIONNAIRE, HOW DIFFICULT HAVE THESE PROBLEMS MADE IT FOR YOU TO DO YOUR WORK, TAKE CARE OF THINGS AT HOME, OR GET ALONG WITH OTHER PEOPLE: SOMEWHAT DIFFICULT
3. WORRYING TOO MUCH ABOUT DIFFERENT THINGS: SEVERAL DAYS
6. BECOMING EASILY ANNOYED OR IRRITABLE: SEVERAL DAYS
5. BEING SO RESTLESS THAT IT IS HARD TO SIT STILL: SEVERAL DAYS
4. TROUBLE RELAXING: SEVERAL DAYS
2. NOT BEING ABLE TO STOP OR CONTROL WORRYING: SEVERAL DAYS
7. FEELING AFRAID AS IF SOMETHING AWFUL MIGHT HAPPEN: SEVERAL DAYS
GAD7 TOTAL SCORE: 7

## 2025-04-14 NOTE — ASSESSMENT & PLAN NOTE
- stopped taking escitalopram.   - has tried and failed zoloft and wellbutrin   - thinks she may have ADHD  - refer to [facility] for diagnostic evaluation

## 2025-04-14 NOTE — PROGRESS NOTES
"Subjective   Patient ID: Kalie Galdamez is a 54 y.o. female who presents for follow up (Shakey at times/Used her 's glucometer and it read 147 after eating 2 pcs of pizza and she had the shakes shortly after eating that./She is still getting the brain zaps, she feels her head feels heavy x 2  yrs on and off, not as bad as they use to be, mostly happens at night. She would like to see neurology if you feel that would be a good idea./Concerns with her weight./Genetic testing labs).    HPI   routine follow up. chronic issues as per assessment and plan. Last seen 8/9/24 through televisit and last seen in person 7/2/24.     She is no longer taking escitalopram. She does not recall why she stopped taking it. States \"it must have made me feel weird.\" Her PHQ-9 score is 4 and her PENNY-7 score is 7. She states she is not feeling depressed. She is feeling anxious. She wonders if she might have Adult ADHD. States she has a difficult time focusing at work. She goes back and forth and has a hard time completing tasks. She will lose train of thought quickly. She states she had issues in school --> academic issues, grades were not good. Denies any disciplinary issues in school. States she was on wellbutrin but that made her feel like she was \"in a tunnel.\" She has been on zoloft and that caused increased anxiety. She complains of \"brain zaps.\" She states this happens daily. Feels like an electric jolt. States they are not as bad as they were when they first started, but they are persisting. Feel almost like a pressure or a jolt in her head.     Patient is interested in genetic testing. She states she heard there was a blood test to see if she has genetic markers for cancer. Her sister has some sort of thyroid cancer. She also had breast cancer.     Review of Systems   Constitutional:  Negative for chills, diaphoresis, fever and unexpected weight change.   HENT:  Negative for congestion, sinus pressure, sinus pain, " "sneezing and sore throat.    Respiratory:  Negative for cough, chest tightness, shortness of breath and wheezing.    Cardiovascular:  Negative for chest pain, palpitations and leg swelling.   Gastrointestinal:  Negative for abdominal pain, constipation, diarrhea, nausea and vomiting.   Endocrine: Negative for cold intolerance, heat intolerance, polydipsia, polyphagia and polyuria.   Genitourinary:  Negative for dysuria, frequency, hematuria and urgency.   Neurological:  Negative for dizziness, syncope, light-headedness, numbness and headaches.   Hematological:  Negative for adenopathy.   Psychiatric/Behavioral:  Negative for confusion and dysphoric mood. The patient is not nervous/anxious.        Objective   /80 (BP Location: Right arm, Patient Position: Sitting, BP Cuff Size: Large adult)   Pulse 87   Resp 16   Ht 1.6 m (5' 3\")   Wt 92.5 kg (204 lb)   SpO2 95%   BMI 36.14 kg/m²     Physical Exam  Vitals and nursing note reviewed.   Constitutional:       General: She is not in acute distress.     Appearance: Normal appearance.   HENT:      Head: Normocephalic and atraumatic.      Nose: Nose normal.   Eyes:      Extraocular Movements: Extraocular movements intact.      Conjunctiva/sclera: Conjunctivae normal.      Pupils: Pupils are equal, round, and reactive to light.   Cardiovascular:      Rate and Rhythm: Normal rate and regular rhythm.      Heart sounds: No murmur heard.     No friction rub. No gallop.   Pulmonary:      Effort: Pulmonary effort is normal.      Breath sounds: Normal breath sounds. No wheezing, rhonchi or rales.   Abdominal:      General: Bowel sounds are normal. There is no distension.      Palpations: Abdomen is soft.      Tenderness: There is no abdominal tenderness.   Musculoskeletal:         General: Normal range of motion.      Cervical back: Normal range of motion and neck supple.   Skin:     General: Skin is warm and dry.   Neurological:      General: No focal deficit present. "      Mental Status: She is alert and oriented to person, place, and time.      Deep Tendon Reflexes: Reflexes normal.   Psychiatric:         Mood and Affect: Mood normal.         Behavior: Behavior normal.         Thought Content: Thought content normal.         Judgment: Judgment normal.         Assessment/Plan

## 2025-04-14 NOTE — ASSESSMENT & PLAN NOTE
"- does not describe them as headaches but more as \"brain zaps\" or like an electric jolt in her brain   - she complains of heaviness in her head as well  - refer to neurology   "

## 2025-04-14 NOTE — ASSESSMENT & PLAN NOTE
- Encouraged healthy lifestyle, including adequate exercise and high fiber, low fat and low carb diet.   - discussed referral to dietician. She declines

## 2025-04-14 NOTE — ASSESSMENT & PLAN NOTE
- 11/2023 LDCT --> (1) 2 mm right middle lobe parenchymal lung nodule (2) 5 mm right minor fissural nodule (3) 2 mm parenchymal lung nodule (4) 2 mm subpleural left lower lobe parenchymal lung nodule. (5) There are no suspicious parenchymal lung nodules.  - overdue for repeat LDCT. Will check now

## 2025-04-14 NOTE — PATIENT INSTRUCTIONS
Kalie Galdamez ,    Thank you for coming in today. We at [facility] appreciate your trust in our care. If you have any questions or concerns about the care you received today, please do not hesitate to contact us at [phone].    The following instructions were discussed today:    - refer to neurology   - refer to [facility]  - refer to genetics  - get CT scan of lungs   - get labs. For blood work: Nothing to eat or drink for at least 10 hours prior. Okay for water or black coffee.   - I recommend the following vaccines at the pharmacy: Tdap, Shingrix, COVID-19

## 2025-04-14 NOTE — ASSESSMENT & PLAN NOTE
- Encouraged healthy lifestyle, including adequate exercise and high   - discussed referral to dietician. She declines at this time

## 2025-04-18 LAB
25(OH)D3+25(OH)D2 SERPL-MCNC: 33 NG/ML (ref 30–100)
ALBUMIN SERPL-MCNC: 4.5 G/DL (ref 3.6–5.1)
ALP SERPL-CCNC: 37 U/L (ref 37–153)
ALT SERPL-CCNC: 23 U/L (ref 6–29)
ANION GAP SERPL CALCULATED.4IONS-SCNC: 9 MMOL/L (CALC) (ref 7–17)
AST SERPL-CCNC: 20 U/L (ref 10–35)
BASOPHILS # BLD AUTO: 58 CELLS/UL (ref 0–200)
BASOPHILS NFR BLD AUTO: 0.8 %
BILIRUB SERPL-MCNC: 0.5 MG/DL (ref 0.2–1.2)
BUN SERPL-MCNC: 22 MG/DL (ref 7–25)
CALCIUM SERPL-MCNC: 9.6 MG/DL (ref 8.6–10.4)
CHLORIDE SERPL-SCNC: 99 MMOL/L (ref 98–110)
CHOLEST SERPL-MCNC: 212 MG/DL
CHOLEST/HDLC SERPL: 3.2 (CALC)
CO2 SERPL-SCNC: 29 MMOL/L (ref 20–32)
CREAT SERPL-MCNC: 0.69 MG/DL (ref 0.5–1.03)
EGFRCR SERPLBLD CKD-EPI 2021: 103 ML/MIN/1.73M2
EOSINOPHIL # BLD AUTO: 190 CELLS/UL (ref 15–500)
EOSINOPHIL NFR BLD AUTO: 2.6 %
ERYTHROCYTE [DISTWIDTH] IN BLOOD BY AUTOMATED COUNT: 13.3 % (ref 11–15)
EST. AVERAGE GLUCOSE BLD GHB EST-MCNC: 123 MG/DL
EST. AVERAGE GLUCOSE BLD GHB EST-SCNC: 6.8 MMOL/L
GLUCOSE SERPL-MCNC: 94 MG/DL (ref 65–99)
HBA1C MFR BLD: 5.9 %
HCT VFR BLD AUTO: 43.3 % (ref 35–45)
HDLC SERPL-MCNC: 67 MG/DL
HGB BLD-MCNC: 14.5 G/DL (ref 11.7–15.5)
LDLC SERPL CALC-MCNC: 127 MG/DL (CALC)
LYMPHOCYTES # BLD AUTO: 2584 CELLS/UL (ref 850–3900)
LYMPHOCYTES NFR BLD AUTO: 35.4 %
MCH RBC QN AUTO: 28.9 PG (ref 27–33)
MCHC RBC AUTO-ENTMCNC: 33.5 G/DL (ref 32–36)
MCV RBC AUTO: 86.3 FL (ref 80–100)
MONOCYTES # BLD AUTO: 453 CELLS/UL (ref 200–950)
MONOCYTES NFR BLD AUTO: 6.2 %
NEUTROPHILS # BLD AUTO: 4015 CELLS/UL (ref 1500–7800)
NEUTROPHILS NFR BLD AUTO: 55 %
NONHDLC SERPL-MCNC: 145 MG/DL (CALC)
PLATELET # BLD AUTO: 349 THOUSAND/UL (ref 140–400)
PMV BLD REES-ECKER: 10.2 FL (ref 7.5–12.5)
POTASSIUM SERPL-SCNC: 3.8 MMOL/L (ref 3.5–5.3)
PROT SERPL-MCNC: 7.7 G/DL (ref 6.1–8.1)
RBC # BLD AUTO: 5.02 MILLION/UL (ref 3.8–5.1)
SODIUM SERPL-SCNC: 137 MMOL/L (ref 135–146)
TRIGL SERPL-MCNC: 83 MG/DL
TSH SERPL-ACNC: 1.56 MIU/L
VIT B12 SERPL-MCNC: 427 PG/ML (ref 200–1100)
WBC # BLD AUTO: 7.3 THOUSAND/UL (ref 3.8–10.8)

## 2025-04-21 ENCOUNTER — TELEPHONE (OUTPATIENT)
Dept: PRIMARY CARE | Facility: CLINIC | Age: 55
End: 2025-04-21
Payer: COMMERCIAL

## 2025-04-21 PROBLEM — R73.03 PREDIABETES: Status: ACTIVE | Noted: 2025-04-14

## 2025-04-22 NOTE — TELEPHONE ENCOUNTER
please let patient know:    Please let patient know blood sugar is elevated. This does not mean diabetes, but it does mean increased risk for developing diabetes.  low carb/ diabetic diet and  exercise information sent through [facility].     Remainder of lab work normal.

## 2025-05-01 ENCOUNTER — APPOINTMENT (OUTPATIENT)
Dept: RADIOLOGY | Facility: CLINIC | Age: 55
End: 2025-05-01
Payer: COMMERCIAL

## 2025-05-22 ENCOUNTER — APPOINTMENT (OUTPATIENT)
Dept: BEHAVIORAL HEALTH | Facility: CLINIC | Age: 55
End: 2025-05-22
Payer: COMMERCIAL

## 2025-05-22 DIAGNOSIS — F32.1 MODERATE MAJOR DEPRESSION (MULTI): ICD-10-CM

## 2025-05-22 DIAGNOSIS — Z79.899 MEDICATION MANAGEMENT: ICD-10-CM

## 2025-05-22 DIAGNOSIS — F41.9 ANXIETY: ICD-10-CM

## 2025-05-22 DIAGNOSIS — R41.840 INATTENTION: ICD-10-CM

## 2025-05-22 PROCEDURE — 1036F TOBACCO NON-USER: CPT

## 2025-05-22 PROCEDURE — 90791 PSYCH DIAGNOSTIC EVALUATION: CPT

## 2025-05-22 ASSESSMENT — ANXIETY QUESTIONNAIRES
GAD7 TOTAL SCORE: 16
7. FEELING AFRAID AS IF SOMETHING AWFUL MIGHT HAPPEN: NOT AT ALL
5. BEING SO RESTLESS THAT IT IS HARD TO SIT STILL: NEARLY EVERY DAY
3. WORRYING TOO MUCH ABOUT DIFFERENT THINGS: MORE THAN HALF THE DAYS
IF YOU CHECKED OFF ANY PROBLEMS ON THIS QUESTIONNAIRE, HOW DIFFICULT HAVE THESE PROBLEMS MADE IT FOR YOU TO DO YOUR WORK, TAKE CARE OF THINGS AT HOME, OR GET ALONG WITH OTHER PEOPLE: VERY DIFFICULT
6. BECOMING EASILY ANNOYED OR IRRITABLE: NEARLY EVERY DAY
1. FEELING NERVOUS, ANXIOUS, OR ON EDGE: NEARLY EVERY DAY
2. NOT BEING ABLE TO STOP OR CONTROL WORRYING: MORE THAN HALF THE DAYS
4. TROUBLE RELAXING: NEARLY EVERY DAY

## 2025-05-22 ASSESSMENT — PATIENT HEALTH QUESTIONNAIRE - PHQ9
10. IF YOU CHECKED OFF ANY PROBLEMS, HOW DIFFICULT HAVE THESE PROBLEMS MADE IT FOR YOU TO DO YOUR WORK, TAKE CARE OF THINGS AT HOME, OR GET ALONG WITH OTHER PEOPLE: VERY DIFFICULT
3. TROUBLE FALLING OR STAYING ASLEEP: NEARLY EVERY DAY
SUM OF ALL RESPONSES TO PHQ QUESTIONS 1-9: 16
6. FEELING BAD ABOUT YOURSELF - OR THAT YOU ARE A FAILURE OR HAVE LET YOURSELF OR YOUR FAMILY DOWN: SEVERAL DAYS
1. LITTLE INTEREST OR PLEASURE IN DOING THINGS: NEARLY EVERY DAY
4. FEELING TIRED OR HAVING LITTLE ENERGY: NEARLY EVERY DAY
2. FEELING DOWN, DEPRESSED OR HOPELESS: NOT AT ALL
8. MOVING OR SPEAKING SO SLOWLY THAT OTHER PEOPLE COULD HAVE NOTICED. OR THE OPPOSITE, BEING SO FIGETY OR RESTLESS THAT YOU HAVE BEEN MOVING AROUND A LOT MORE THAN USUAL: NOT AT ALL
5. POOR APPETITE OR OVEREATING: NEARLY EVERY DAY
7. TROUBLE CONCENTRATING ON THINGS, SUCH AS READING THE NEWSPAPER OR WATCHING TELEVISION: NEARLY EVERY DAY
9. THOUGHTS THAT YOU WOULD BE BETTER OFF DEAD, OR OF HURTING YOURSELF: NOT AT ALL
SUM OF ALL RESPONSES TO PHQ9 QUESTIONS 1 & 2: 3

## 2025-05-22 ASSESSMENT — ENCOUNTER SYMPTOMS
CONSTITUTIONAL NEGATIVE: 1
NERVOUS/ANXIOUS: 1

## 2025-05-22 NOTE — PROGRESS NOTES
"Impression : Patient reports increased anxiety and explains she feels like it could be ADHD related. Explains that her son was diagnosed with ADHD and currently taking Vyvanse. Patient will need an Adult evaluation for ADHD , get an EKG done due to history of Mitral Valve abnormality, see a cardiologist at least twice a year,  and get UDS done.    I performed this visit using real-time telehealth tools, including an AUDIO/VIDEO connection between Kalie Galdamez who is at Work and BEBE Cai who is at At home office working via Telehealth.    Virtual or Telephone Consent    An interactive audio and video telecommunication system which permits real time communications between the patient (at the originating site) and provider (at the distant site) was utilized to provide this telehealth service.   Verbal consent was requested and obtained from Kalie Galdamez on this date, 05/22/25 for a telehealth visit and the patient's location was confirmed at the time of the visit.    Patient is reminded that if there is SI to call [phone] and get themselves to the closest ED for evaluation, otherwise contact me for other questions/concerns.    Kalie Galdamez is a 54 y.o. female patient with a chief complaint of  \" I just have a lot of stress. When I talked to my doctor, she referred me. 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    HPI  Background:    Patient is a referral from PCP. Patient is currently at work for this visit.  Patient explains that symptoms of anxiety began in her 20s around the time she move out of parents home. Reports history of panic  attacks in her 20s.  Patient explains that during the time symptoms exacerbated it was when she lost her mother in 2023 and her mother in law in 2024. Reports mother in law was raising the nieces that she has been taking care of now. The duration of symptoms occurred for many years.     Characteristics/Recent psychiatric symptoms (pertinent positives and negatives):      Reports  \" I feel like my head does not stop. \" Reports no longer having panic attacks like in her 20s. Reports history of Mitral Valve abnormalities in her 20s. Patient reports experiencing anxiety characterized by uncontrolled worry, mind racing, restlessness. Reports hx of panic attacks. Denies wishes for death or consideration for suicide.  CSSRS negative. Denies ever experiencing jerardo of psychosis. Denies hx hospitalization.   Reports getting 6 hours of sleep at night. Appetite is normal.  Patient ranks the severity of anxiety using the PENNY 7 scale with a rating of 16 for  anxiety symptoms. Patient ranks the severity of depression using the PHQ 9 scale with a rating of 16 for  depressive symptoms.     NOTE: Symptom scale is rated where 0 = no symptoms at all, and 10 = symptoms so severe that pt is an imminent danger to themselves or others and requires hospitalization.    Anxiety and Depression remain(s) present more days than not, which has remained unchanged over the past few weeks. Kalie Galdamez rates the severity of psych symptoms as a 3/10 for anxiety, 2/10 for depression, noting symptom improvement with \" I don't know \" and worsening of symptoms with feeling overstimulated \" with stuff.\"        Psychiatric Review Of Systems:    -Depressive Symptoms: concentration and low energy  -Manic " "Symptoms: negative  -Anxiety Symptoms: General Anxiety Disorder (PENNY)PENNY Behaviors: excessive anxiety/worry, difficulty concentrating, easily fatigued, irritability, and restlessness  -Psychotic Symptoms: negative  -Other Symptoms:  -Sleep/Energy/Motivation: Decreased   -Appetite/Weight Changes: Appetite is okay, gained weight  -Psychosis: Denies   -SI/HI: Denies        REVIEW OF SYSTEMS  Review of Systems   Constitutional: Negative.    Psychiatric/Behavioral:  The patient is nervous/anxious.      All other ROS negative    [x]  Firearms at home  HISTORY  PSYCH HISTORY  -Psych Hx: PENNY  -Psych Hospitalization Hx: Denies  -Suicide Attempt Hx: Denies  -Self-Harm/Violence Hx:Denies  -Current psych meds: None  -Psych Med Hx: Zoloft \" made me feel worst and I ended up at the hospital\", Wellbutrin \" made my head feel crazy. Lexapro \" I don't remember, but I did not feel right\"    SUBSTANCE USE HISTORY  -Substance Use Hx: Reports 1 glass of wine nightly  -Tobacco: Denies  -Use of alcohol: occasional, social use  -Use of caffeine: coffee 3 cups /day  -Substance Abuse Treatment Hx: Denies    FAMILY HISTORY  -Family Psych Hx: Reports Son has ADHD  -Family Suicide Hx: Denies  -Family Substance Abuse Hx: Denies  SOCIAL HISTORY  -Supports: Family and friends  -Housing: Lives with , 2 children, nieces, nephew, and 2 pets  -Income: Full time  at a School  -Education: Associate Degree  -Legal: Denies  -Abuse Hx: Denies    Current Medications:  Current Medications[1]     Medical History:  Medical History[2]  Surgical History:  Surgical History[3]  Family History:  Family History[4]  Social History:  Social History     Socioeconomic History    Marital status:      Spouse name: Not on file    Number of children: 2    Years of education: Not on file    Highest education level: Not on file   Occupational History    Not on file   Tobacco Use    Smoking status: Former     Current packs/day: 0.00     Average packs/day: " "1 pack/day for 25.0 years (25.0 ttl pk-yrs)     Types: Cigarettes     Start date:      Quit date:      Years since quittin.3    Smokeless tobacco: Never   Vaping Use    Vaping status: Never Used   Substance and Sexual Activity    Alcohol use: Yes     Comment: A glass  of wine nightly    Drug use: Never    Sexual activity: Yes     Partners: Male     Birth control/protection: Female Sterilization   Other Topics Concern    Not on file   Social History Narrative    Not on file     Social Drivers of Health     Financial Resource Strain: Not on file   Food Insecurity: No Food Insecurity (10/22/2024)    Hunger Vital Sign     Worried About Running Out of Food in the Last Year: Never true     Ran Out of Food in the Last Year: Never true   Transportation Needs: No Transportation Needs (10/22/2024)    PRAPARE - Transportation     Lack of Transportation (Medical): No     Lack of Transportation (Non-Medical): No   Physical Activity: Not on file   Stress: Not on file   Social Connections: Not on file   Intimate Partner Violence: Not on file   Housing Stability: Not on file       Vitals:  There were no vitals filed for this visit.    Allergies:  RX Allergies[5]  -PCP: Yuko Nugent MD  -Pt reports currently is not pregnant  -TBI/head trauma/LOC/seizure hx: Denies      Objective   Mental Status Exam    Appearance: Well groomed , appropriate eye contact    Attitude: Cooperative, and engaged in conversation.    Behavior: Appropriate eye contact.     Motor Activity: No psychomotor agitation or retardation. No abnormal movements, tremors or tics. No evidence of extrapyramidal symptoms or tardive dyskinesia.    Speech: Regular rate, rhythm, volume. Spontaneous, no pressured speech.    Mood:   \" I  am okay \"    Affect: Full range, mood congruent.    Thought Process: Linear, logical, and goal-directed. No loose associations or gross thought disorganization.    Thought Content:   Denied current suicidal ideation or thoughts of " harm to self, denied homicidal ideation or thoughts of harm to others. No delusional thinking elicited. No perseverations or obsessions identified.     Perception: Did not endorse auditory or visual hallucinations, did not appear to be responding to hallucinatory stimuli.     Cognition: Alert, oriented x3. Preserved attention span and concentration, recent and remote memory. Adequate fund of knowledge. No deficits in language.     Insight: Fair, in regards to understanding mental health condition    Judgement: Fair        Physical Exam    IMPRESSION  -GET an adult ADHD evaluation    -Kalie was seen today for inattention, anxiety, med management and psychiatric evaluation.  Diagnoses and all orders for this visit:  Anxiety  -     Referral to [facility]  -     Follow Up In Psychiatry; Future  Moderate major depression (Multi)  -     Referral to [facility]  -     Follow Up In Psychiatry; Future  Inattention  -     Referral to [facility]  -     Follow Up In Psychiatry; Future  Medication management  -     Drug Screen, Urine With Reflex to Confirmation; Future  -     Amphetamine Confirm, Urine; Future  -     Drug Screen, Urine With Reflex to Confirmation  -     Amphetamine Confirm, Urine  -     ECG 12 lead (Ancillary Performed); Future  -     Follow Up In Psychiatry; Future         MEDICAL-DECISION MAKING  - START psychotherapy. Follow-up with psychiatry in 8 weeks for medication evaluation and effectiveness.        Psych med regimen as follows:   -GET an adult ADHD evaluation done by a PhD psychologist   -GET EKG done due to history of Mitral valve Abnormality  - SCHEDULE  a follow up appointment with your cardiologist for Mitral valve Abnormality   -GET UDS done   -START Psychotherapy  -CONTINUE exercising and eating heathy diet  -Safety plan reviewed   -READ attached information about the prescribed new medication   - CALL OFFICE IN CASE OF SIDE EFFECT TO  SCHEDULE A VISIT FOR ASSESSMENT -[phone]  /HI ASSESSMENT  -Risk Assessment: The pt is currently a low acute risk of suicide and self-harm due to no past suicide attempt(s) and not currently endorsing thoughts of suicide. The pt is currently a low acute risk of violence and harm to others due to no past history of violence and not currently threatening others.  -Suicidal Risk Factors: current psychiatric illness  -Violence Risk Factors: current psychiatric illness  -Protective Factors: fear of suicide  -Plan to Reduce Risk: Establish medication regimen and outpatient follow-up care   Denied current suicidal ideation or thoughts of harm to self, denied homicidal ideation or thoughts of harm to others. No delusional thinking elicited. No perseverations or obsessions identified.       PLAN  -Continue Medications as directed.  -Follow-up with this provider in 8 weeks.  -Risks/benefits/assessment of medication interventions discussed with pt; pt agreeable to plan. Will continue to monitor for symptoms mgmt and SEs and adjust plan as needed.  -MI to increase coping skills/behavior regulation.  -Safety plan reviewed.  -Call  Psychiatry at [phone] with issues.  -For [facility] residents, [employer] is a 24/7 hotline you can call for assistance at [phone]) [phone]. Please call 911 or go to your closest Emergency Room if you feel worse. This includes thoughts of hurting yourself or anyone else, or having other troubles such as hearing voices, seeing visions, or having new and scary thoughts about the people around you.    OARRS:  BEBE Cai on 5/22/2025 12:04 PM  I have personally reviewed the OARRS report for Kalie Galdamez. I have considered the risks of abuse, dependence, addiction and diversion  Medication is felt to be clinically appropriate based on documented diagnosis.        BEBE Cai  Record Review: extensive   CALL OFFICE IN CASE OF SIDE EFFECT TO SCHEDULE A  VISIT FOR ASSESSMENT -[phone]  Follow up:    at 0900 virtually  Time Spent:  Prep:   Direct time: 46 minutes  Documentation: 5 minutes  Total: 51 minutes       [1]   Current Outpatient Medications:     albuterol 90 mcg/actuation inhaler, Inhale 2 puffs every 6 hours if needed for wheezing., Disp: 54 g, Rfl: 1    chlorthalidone (Hygroton) 25 mg tablet, Take 1 tablet (25 mg) by mouth once daily., Disp: 90 tablet, Rfl: 3    cholecalciferol (Vitamin D3) 50 mcg (2,000 unit) capsule, Take 1 capsule (50 mcg) by mouth once daily., Disp: 90 capsule, Rfl: 3    famotidine (Pepcid) 20 mg tablet, Take 1 tablet (20 mg) by mouth 2 times a day as needed for heartburn., Disp: 180 tablet, Rfl: 3    fluticasone (Flonase) 50 mcg/actuation nasal spray, Administer 1 spray into each nostril once daily., Disp: 48 g, Rfl: 3    levothyroxine (Synthroid, Levoxyl) 100 mcg tablet, Take 1 tablet (100 mcg) by mouth once daily., Disp: 90 tablet, Rfl: 3    omeprazole (PriLOSEC) 40 mg DR capsule, Take 1 capsule (40 mg) by mouth once daily in the morning. Take before meals., Disp: 90 capsule, Rfl: 3    EPINEPHrine 0.3 mg/0.3 mL injection syringe, Inject 0.3 mL (0.3 mg) into the muscle. (Patient not taking: Reported on 2025), Disp: , Rfl:     fexofenadine-pseudoephedrine (Allegra-D)  mg 12 hr tablet, Take 1 tablet by mouth once daily. Do not crush, chew, or split., Disp: , Rfl:   [2]   Past Medical History:  Diagnosis Date    First degree hemorrhoids 2023    Generalized hyperhidrosis 2023    Rotavirus infection 2023    Urinary frequency 2023   [3]   Past Surgical History:  Procedure Laterality Date     SECTION, LOW TRANSVERSE      ,    OTHER SURGICAL HISTORY      Endometrial ablation    OTHER SURGICAL HISTORY  2021    Tubal ligation bilateral    OTHER SURGICAL HISTORY      Cholecystectomy    OTHER SURGICAL HISTORY      Tonsillectomy    OTHER SURGICAL HISTORY   "1994,1995,1996    Dilation and curettage   [4]   Family History  Problem Relation Name Age of Onset    Heart failure Mother      Coronary artery disease Father      Stroke Father      Lung cancer Father      Breast cancer Sister      Thyroid cancer Sister      Other (avm of brain) Sister      Stroke Sister     [5]   Allergies  Allergen Reactions    Amoxicillin-Pot Clavulanate Swelling     SWELLING ON LIPS    Codeine Shortness of breath     heart racing    Doxycycline Itching    Sulfamethoxazole-Trimethoprim Anaphylaxis     BACTRIM    Levofloxacin Hives    Cephalexin Other    Escitalopram Unknown     She doesn't remember; \"didn't feel right\"    Wellbutrin [Bupropion Hcl] Other     \"Felt like I was in a tunnel.\"    Telithromycin Itching    Zoloft [Sertraline] Anxiety     "

## 2025-06-02 ENCOUNTER — HOSPITAL ENCOUNTER (OUTPATIENT)
Dept: RADIOLOGY | Facility: CLINIC | Age: 55
Discharge: HOME | End: 2025-06-02
Payer: COMMERCIAL

## 2025-06-02 DIAGNOSIS — F17.211 CIGARETTE NICOTINE DEPENDENCE IN REMISSION: ICD-10-CM

## 2025-06-02 PROCEDURE — 71271 CT THORAX LUNG CANCER SCR C-: CPT

## 2025-06-02 PROCEDURE — 71271 CT THORAX LUNG CANCER SCR C-: CPT | Performed by: RADIOLOGY

## 2025-06-03 ENCOUNTER — TELEPHONE (OUTPATIENT)
Dept: PRIMARY CARE | Facility: CLINIC | Age: 55
End: 2025-06-03
Payer: COMMERCIAL

## 2025-06-03 NOTE — TELEPHONE ENCOUNTER
please let patient know  CT scan did show some small lung nodules, but they appeared benign (non-cancerous). Will need to repeat CT scan in 1 year to ensure stability.

## 2025-06-04 ENCOUNTER — PATIENT MESSAGE (OUTPATIENT)
Dept: PRIMARY CARE | Facility: CLINIC | Age: 55
End: 2025-06-04
Payer: COMMERCIAL

## 2025-06-16 ENCOUNTER — APPOINTMENT (OUTPATIENT)
Dept: NEUROLOGY | Facility: CLINIC | Age: 55
End: 2025-06-16
Payer: COMMERCIAL

## 2025-07-11 NOTE — PROGRESS NOTES
History of Present Illness:  Kalie Galdamez is a 54 y.o. female with a family history of breast, thyroid, colon, and lung cancer.  Kalie Galdamez was referred to the Cancer Genetics Clinic at [facility] by Yuko Nugent MD for consideration of genetic testing.    Cancer Medical History:  Personal history of cancer? No    Prior hereditary cancer (germline) genetic testing? No    Prior hereditary cancer (germline) genetic testing in family members? No    Cancer screening history:  Mammograms? Yes, most recent 11/2024, BIRADS-1    Patient denies personal history of breast biopsy.   PAP smear? Yes, most recent 2021 NILM   Colonoscopy?   Yes, most recent 05/2023 - return 5 years (2028)  - One 6 mm polyp in the cecum, removed with a cold     snare. Resected and retrieved. - Sessile serrated adenoma   - One 10 mm polyp in the mid ascending colon, removed  with a hot snare. Resected and retrieved. - sessile serrated adenoma   - One 5 mm polyp in the mid descending colon, removed  with a cold biopsy forceps. Resected and retrieved.   Biopsied. - SPECIMEN LABELED AS 'DESCENDING POLYP': PORTIONS OF COLONIC MUCOSA WITH NO   SIGNIFICANT PATHOLOGIC FINDINGS     First colonoscopy was in year 2013, reportedly normal per patient report (no records available).     Upper endoscopy? Yes, most recent 2023     Findings:       The examined esophagus was normal. Biopsies were obtained from the        proximal and distal esophagus with cold forceps for histology of        suspected eosinophilic esophagitis.       Diffuse mild inflammation characterized by erythema was found in the        gastric antrum.       The examined duodenum was normal. Biopsies for histology were taken with        a cold forceps for evaluation of celiac disease.  Impression:              - Normal esophagus.    - Gastritis.   - Normal examined duodenum. Biopsied.   - Biopsies were taken with a cold forceps for   evaluation of eosinophilic  esophagitis.      FINAL DIAGNOSIS  A.  2ND PORTION DUODENUM: PORTIONS OF SMALL INTESTINAL MUCOSA WITH INCREASED  INTRAEPITHELIAL LYMPHOCYTOSIS, SEE NOTE.    B.  DUODENAL BULB: SMALL INTESTINAL MUCOSA WITH MILD INTRAEPITHELIAL  LYMPHOCYTOSIS, SEE NOTE.    Note: Intra-epithelial lymphocytes are noted without evidence of villous  atrophy.    These findings are nonspecific in nature.  The differential diagnosis includes  gluten sensitivity or family history of gluten sensitivity, allergy to other  food antigens, autoimmune disease and other entities such as IgA deficiency.    Other common associations include Helicobacter pylori infection and post viral  gastroenteritis.  Drug interactions especially nonsteroidal anti-inflammatory  drugs may also give this appearance.    Of note celiac serology may initially be negative in patients that subsequently  proved to be gluten sensitive.    C.  ESOPHAGUS AT 35CM: SQUAMOUS MUCOSA WITH NO SIGNIFICANT PATHOLOGIC FINDINGS.    D.  ESOPHAGUS AT 18CM: SQUAMOUS MUCOSA WITH NO SIGNIFICANT PATHOLOGIC FINDINGS.       Dermatology? Most recent 10 years ago, spot removed from nose, limited information available   Other cancer screening? Does regular imaging for lung nodule, initially detected on x ray    Reproductive History:  Number of children: 2  Number of pregnancies: 5  Age first birth: 27  Breast feeding? No  Menarche (age): 11  Menopause (age): 50    OCP: Yes, for ~10 years total  HRT: No    Hysterectomy? No; ablation done in   Oophorectomy? No    Family history:  A 4-generation pedigree was obtained and was significant for the following:     - Maternal half sister (64) diagnosed at 63 with stage 1 unilateral breast cancer. She has a history of an unknown type of thyroid cancer diagnosed in her 40's.  -  Maternal grandmother  of an unknown cancer type (potentially lung cancer), smoking history. Limited information available.    - Father diagnosed with lung cancer at 72,   at 73. Smoking history.   - Paternal uncle diagnosed with colon cancer in his 70/80's,  a few years after.  - Paternal grandfather  in his 50/60's, potentially due to cancer. No information regarding his potential diagnosis.    Maternal ancestry is White/.  Paternal ancestry is White/. There is no known Ashkenazi Rastafarian ancestry. Consanguinity was denied.     Genetic counseling:    Summary  Ms. Galdamez is a 54 y.o. female with a family history of breast, thyroid, colon, and lung cancer.  Overall, Ms. Galdamez's personal and family history is not very concerning for a hereditary cancer syndrome, and she does not meet NCCN criteria for genetic testing.    We discussed that testing can be done on a self-pay basis of $250. Testing can be medically helpful,as it could help clarify cancer risks in the family, and may impact recommendations for cancer surveillance and/or the need for risk-reducing options. Following our discussion, Ms. Galdamez was reassured and expressed an understanding that she has a low likelihood of carrying a single-gene mutation. She understands that she does not meet NCCN criteria/insurance criteria for genetic testing. she declined the option to self-pay for testing at this time.  Our discussion is summarized below.    Genetic Counseling Discussion    Ms. Galdamez is a 54 y.o. female with a family history of breast, thyroid, colon, and lung cancer.     We reviewed genes and the concept of hereditary cancer. We discussed that most cancers are not due to an inherited genetic susceptibility. However, in about 5-10% of cases, there is an inherited genetic mutation that can make a person more susceptible to developing certain types of cancer. Within families with a genetic predisposition to cancer, we often see certain patterns, such as multiple family members with cancer and cancers occurring in multiple generations. In addition, earlier onset and/or bilateral cancers are  suggestive of an inherited predisposition. There also tends to be a clustering of certain types of cancer in these families, such as breast cancer and ovarian cancer.     Overall, Ms. Galdamez's personal and family history is not very concerning for a hereditary cancer syndrome, and she does not meet NCCN criteria for genetic testing.     Further, Ms. Galdamez's family history is not particularly concerning for a hereditary cancer predisposition given the reported ages of onset, types of cancer, and number of affected relatives. Hereditary cancer tends to be early-onset (onset <50 years old) and often presents in a multiple generation pattern and/or with a clustering of certain types of cancers in the family. We further discussed that the cancers in the family  are overall more suggestive of sporadic cancers based on the types of cancer, ages of diagnosis, and number of affected relatives.  The presence of a known environmental risk factor, such as smoking, further decreases suspicion of an underlying hereditary cause for some cancers in the family (I.e. fathers lung cancer).     Given this reported history, Ms. Galdamez does not meet NCCN criteria for genetic testing, and understands she has a low likelihood of testing positive for a mutation in a cancer predisposition gene. We did offer Ms. Galdamez the option to self-pay for testing if interested.     We discussed the option for genetic testing in more detail. As an example of a hereditary cancer predisposition syndrome, we discussed the BRCA1 and BRCA2 genes, which are two genes that have been linked to early-onset breast and/or ovarian cancer, causing HBOC (hereditary breast and ovarian cancer syndrome).  Mutations in these genes are inherited in a dominant pattern and confer up to an 72% lifetime risk for breast cancer.  This is elevated compared to the general population risk of 10-12%.  In addition, BRCA1 and BRCA2 mutation carriers have up to a 58% lifetime  risk for ovarian cancer, which is elevated over the 2% general population risk.  Mutation carriers who have already been diagnosed with cancer have an increased risk to develop a second, contralateral breast cancer.  BRCA1/2 gene mutation carriers have an increased risk for male breast cancer, prostate cancer, melanoma, and pancreatic cancer. We discussed potential medical management changes for individuals who carry a BRCA1/2 mutation, such as increased breast cancer surveillance and consideration of a risk-reducing bilateral mastectomy.   Gene mutations in BRCA1 and BRCA2 are inherited in an autosomal dominant fashion. This means that if an individual has a change in either of these genes, their siblings and children have a 50% chance of also having that gene change and a 50% chance of not having the gene change.   We discussed that there are multiple genes associated with increased breast cancer risk.  Some genes, like the BRCA1 and BRCA2 genes, are considered highly penetrant breast cancer genes, meaning a mutation in the gene confers a high risk of breast cancer.  Additionally, there are other intermediate (moderate risk) breast cancer genes. For some of the moderate risk genes, there is often limited information regarding the degree to which a mutation in the gene impacts ones risk for different types of cancers.  Additionally, for some of these moderate risk genes, the appropriate management for individuals who have a mutation in one of these genes is not always clear.  Our knowledge about the cancer risks associated with mutations in these moderate risk genes is always growing, and we will likely be able to provide more comprehensive information in the future. In many cases, even if an individual tests positive for a mutation in a moderate risk gene, recommendations are still based on the family history, not the positive test result..    Following our discussion, MsAmaury Rudolph was reassured and expressed an  understanding that she has a low likelihood of carrying a single-gene mutation. she understands that she does not meet NCCN criteria/insurance criteria for genetic testing. she declined the option to self-pay for testing at this time.     Family History of Cancer  In the absence of an identifiable gene mutation, Alyssas cancer risks are shaped by her personal and family history of cancer. We discussed that Ms. Galdamez should follow their primary care providers' recommendations for all other age-related cancer screenings, such as mammograms, pap smears, colonoscopies, etc. Kalie may also have slightly increased risks for other cancers based on the family history. Any cancer surveillance planning for Kalie should involve a discussion with their physicians and an assessment of their family history and personal risk factors.   We ran a Tyrer-Cuzick risk model to estimate Kalie's lifetime risk of breast cancer, since she has a maternal half sister with breast cancer.E Since her risk does not reach or exceed 20%, we reviewed that she should follow her primary care physician's recommendations for age-appropriate breast cancer screening.  Per current national (NCCN) guidelines, screening should include a regular clinical encounter, breast awareness, and yearly mammogram beginning at age 40. We encourage her to discuss the family history and national recommendations with Aguilar Nugent, her primary care provider.  We discussed that Ms. Galdamez has a slightly increased risk (FRR=1.05) to develop colorectal cancer as she has a second-degree relatives who had this (PMID: 55885127).  The general population risk to develop colorectal cancer is 4.2%.  Individuals with a second degree relatives with colorectal cancer should start having colonoscopies at 45 and repeat every 10 years, or if positive, repeat per colonoscopy findings.  We also discussed her sisters diagnosis of thyroid cancer. The specific type of thyroid cancer is  unknown. Over 90% of all thyroid cancers are sporadic, but sometimes can be familial.  We reviewed that non-medullary thyroid carcinoma (NMTC) is the most common form of thyroid cancer, accounting for more than 95% of cases. Previous studies have estimated a 5.47-fold increased risk for NMTC (non-medullary thyroid cancer) for first-degree relatives of individuals with NMTC (PMID: 98110602). Her risk is likely lower given that she only has a second degree relative that is affected. The general population's risk to develop thyroid cancer is around ~1.2%. Kalie should talk to her PCP about the family history and to determine if any screening measures are appropriate.    Kalie was encouraged to contact me with any changes to her personal and family history of cancer, as this can impact our risk assessment. If she gets any clarification on the unknown types of cancer in  her family (I.e. maternal grandmother and paternal grandfather), she is encouraged to update us with this information as this can impact our risk assessment. Additionally, we discussed the importance of continuing to follow her physicians' recommendations with regard to her cancer surveillance.        PLAN:  Ms. Galdamez's personal and family history is not very concerning for a hereditary cancer syndrome, and she does not meet NCCN criteria for genetic testing. We discussed that testing can be done on a self-pay basis of $250. Testing can be medically helpful,as it could help clarify cancer risks in the family, and may impact recommendations for cancer surveillance and/or the need for risk-reducing options. Following our discussion, Ms. Galdamez was reassured and expressed an understanding that she has a low likelihood of carrying a single-gene mutation. She understands that she does not meet NCCN criteria/insurance criteria for genetic testing. she declined the option to self-pay for testing at this time.   Kalie was encouraged to contact me with  any changes to her personal and family history of cancer, as this can impact our risk assessment. If she gets any clarification on the unknown types of cancer in  her family (I.e. maternal grandmother and paternal grandfather), she is encouraged to update us with this information as this can impact our risk assessment. Additionally, we discussed the importance of continuing to follow her physicians' recommendations with regard to her cancer surveillance.    We remain available to Ms. Galdamez at [phone] if any questions arise regarding information discussed at today's visit.    Julieta Romero MS,   Licensed Genetic Counselor  [facility] [employer]  Phone: [phone]     Total time spent on day of encounter: 71 minutes (36 minutes with patient, 35 minutes on pre/post patient care activities, including documentation).    An virtual (video and audio) between the patient (at the originating site) and provider (at the distant site) was utilized to provide this telehealth service. Verbal consent was requested and obtained from Kalie M Rudolph on this date, July 23, 2025 for a telehealth visit.  The patient stated they were located in [place] at the time of visit.

## 2025-07-22 ENCOUNTER — APPOINTMENT (OUTPATIENT)
Dept: BEHAVIORAL HEALTH | Facility: CLINIC | Age: 55
End: 2025-07-22
Payer: COMMERCIAL

## 2025-07-23 ENCOUNTER — TELEMEDICINE CLINICAL SUPPORT (OUTPATIENT)
Dept: GENETICS | Facility: CLINIC | Age: 55
End: 2025-07-23
Payer: COMMERCIAL

## 2025-07-23 DIAGNOSIS — Z71.83 ENCOUNTER FOR NONPROCREATIVE GENETIC COUNSELING: Primary | ICD-10-CM

## 2025-07-23 DIAGNOSIS — Z80.1 FAMILY HISTORY OF LUNG CANCER: ICD-10-CM

## 2025-07-23 DIAGNOSIS — Z80.8 FAMILY HISTORY OF THYROID CANCER: ICD-10-CM

## 2025-07-23 DIAGNOSIS — Z80.3 FAMILY HISTORY OF BREAST CANCER: ICD-10-CM

## 2025-07-23 PROCEDURE — 96041 GENETIC COUNSELING SVC EA 30: CPT

## 2025-08-04 ENCOUNTER — APPOINTMENT (OUTPATIENT)
Dept: PRIMARY CARE | Facility: CLINIC | Age: 55
End: 2025-08-04
Payer: COMMERCIAL

## 2025-08-27 ENCOUNTER — APPOINTMENT (OUTPATIENT)
Dept: PRIMARY CARE | Facility: CLINIC | Age: 55
End: 2025-08-27
Payer: COMMERCIAL

## 2025-08-27 VITALS
WEIGHT: 203 LBS | RESPIRATION RATE: 16 BRPM | BODY MASS INDEX: 35.97 KG/M2 | DIASTOLIC BLOOD PRESSURE: 74 MMHG | OXYGEN SATURATION: 100 % | SYSTOLIC BLOOD PRESSURE: 116 MMHG | HEART RATE: 84 BPM | HEIGHT: 63 IN

## 2025-08-27 DIAGNOSIS — R10.11 RUQ PAIN: ICD-10-CM

## 2025-08-27 DIAGNOSIS — K59.00 CONSTIPATION, UNSPECIFIED CONSTIPATION TYPE: ICD-10-CM

## 2025-08-27 DIAGNOSIS — E66.01 CLASS 2 SEVERE OBESITY DUE TO EXCESS CALORIES WITH SERIOUS COMORBIDITY AND BODY MASS INDEX (BMI) OF 35.0 TO 35.9 IN ADULT: ICD-10-CM

## 2025-08-27 DIAGNOSIS — R73.03 PREDIABETES: ICD-10-CM

## 2025-08-27 DIAGNOSIS — R41.840 INATTENTION: ICD-10-CM

## 2025-08-27 DIAGNOSIS — I10 ESSENTIAL HYPERTENSION: ICD-10-CM

## 2025-08-27 DIAGNOSIS — R51.9 PRESSURE IN HEAD: ICD-10-CM

## 2025-08-27 DIAGNOSIS — R25.2 LEG CRAMPS: ICD-10-CM

## 2025-08-27 DIAGNOSIS — F41.9 ANXIETY: ICD-10-CM

## 2025-08-27 DIAGNOSIS — Z88.9 MULTIPLE DRUG ALLERGIES: ICD-10-CM

## 2025-08-27 DIAGNOSIS — F32.1 MODERATE MAJOR DEPRESSION (MULTI): Primary | ICD-10-CM

## 2025-08-27 DIAGNOSIS — E66.812 CLASS 2 SEVERE OBESITY DUE TO EXCESS CALORIES WITH SERIOUS COMORBIDITY AND BODY MASS INDEX (BMI) OF 35.0 TO 35.9 IN ADULT: ICD-10-CM

## 2025-08-27 DIAGNOSIS — E03.9 ADULT HYPOTHYROIDISM: ICD-10-CM

## 2025-08-27 PROCEDURE — 99214 OFFICE O/P EST MOD 30 MIN: CPT | Performed by: FAMILY MEDICINE

## 2025-08-27 PROCEDURE — 3078F DIAST BP <80 MM HG: CPT | Performed by: FAMILY MEDICINE

## 2025-08-27 PROCEDURE — 3074F SYST BP LT 130 MM HG: CPT | Performed by: FAMILY MEDICINE

## 2025-08-27 PROCEDURE — 1036F TOBACCO NON-USER: CPT | Performed by: FAMILY MEDICINE

## 2025-08-27 PROCEDURE — 3008F BODY MASS INDEX DOCD: CPT | Performed by: FAMILY MEDICINE

## 2025-08-27 RX ORDER — FLUOXETINE 10 MG/1
10 CAPSULE ORAL DAILY
Qty: 90 CAPSULE | Refills: 0 | Status: SHIPPED | OUTPATIENT
Start: 2025-08-27 | End: 2025-11-25

## 2025-08-27 RX ORDER — EPINEPHRINE 0.3 MG/.3ML
0.3 INJECTION SUBCUTANEOUS ONCE AS NEEDED
Qty: 2 EACH | Refills: 0 | Status: SHIPPED | OUTPATIENT
Start: 2025-08-27

## 2025-08-27 RX ORDER — FEXOFENADINE HCL 180 MG/1
180 TABLET ORAL DAILY
COMMUNITY

## 2025-08-27 ASSESSMENT — ANXIETY QUESTIONNAIRES
IF YOU CHECKED OFF ANY PROBLEMS ON THIS QUESTIONNAIRE, HOW DIFFICULT HAVE THESE PROBLEMS MADE IT FOR YOU TO DO YOUR WORK, TAKE CARE OF THINGS AT HOME, OR GET ALONG WITH OTHER PEOPLE: VERY DIFFICULT
7. FEELING AFRAID AS IF SOMETHING AWFUL MIGHT HAPPEN: NEARLY EVERY DAY
2. NOT BEING ABLE TO STOP OR CONTROL WORRYING: MORE THAN HALF THE DAYS
6. BECOMING EASILY ANNOYED OR IRRITABLE: MORE THAN HALF THE DAYS
4. TROUBLE RELAXING: NEARLY EVERY DAY
3. WORRYING TOO MUCH ABOUT DIFFERENT THINGS: MORE THAN HALF THE DAYS
1. FEELING NERVOUS, ANXIOUS, OR ON EDGE: NEARLY EVERY DAY
GAD7 TOTAL SCORE: 17
5. BEING SO RESTLESS THAT IT IS HARD TO SIT STILL: MORE THAN HALF THE DAYS

## 2025-08-27 ASSESSMENT — ENCOUNTER SYMPTOMS
PALPITATIONS: 0
FREQUENCY: 0
POLYDIPSIA: 0
UNEXPECTED WEIGHT CHANGE: 0
DYSPHORIC MOOD: 0
CHEST TIGHTNESS: 0
NAUSEA: 0
NERVOUS/ANXIOUS: 1
FEVER: 0
DIAPHORESIS: 0
HEMATURIA: 0
CONSTIPATION: 1
WHEEZING: 0
ABDOMINAL PAIN: 1
NUMBNESS: 0
DIARRHEA: 0
LIGHT-HEADEDNESS: 0
SORE THROAT: 0
SINUS PAIN: 0
COUGH: 0
DIZZINESS: 0
CHILLS: 0
ADENOPATHY: 0
POLYPHAGIA: 0
SHORTNESS OF BREATH: 0
HEADACHES: 0
VOMITING: 0
DYSURIA: 0
DECREASED CONCENTRATION: 1
CONFUSION: 0
SINUS PRESSURE: 0

## 2025-08-27 ASSESSMENT — PATIENT HEALTH QUESTIONNAIRE - PHQ9
3. TROUBLE FALLING OR STAYING ASLEEP: NEARLY EVERY DAY
4. FEELING TIRED OR HAVING LITTLE ENERGY: MORE THAN HALF THE DAYS
5. POOR APPETITE OR OVEREATING: NEARLY EVERY DAY
9. THOUGHTS THAT YOU WOULD BE BETTER OFF DEAD, OR OF HURTING YOURSELF: NOT AT ALL
1. LITTLE INTEREST OR PLEASURE IN DOING THINGS: SEVERAL DAYS
SUM OF ALL RESPONSES TO PHQ QUESTIONS 1-9: 18
8. MOVING OR SPEAKING SO SLOWLY THAT OTHER PEOPLE COULD HAVE NOTICED. OR THE OPPOSITE, BEING SO FIGETY OR RESTLESS THAT YOU HAVE BEEN MOVING AROUND A LOT MORE THAN USUAL: MORE THAN HALF THE DAYS
2. FEELING DOWN, DEPRESSED OR HOPELESS: MORE THAN HALF THE DAYS
SUM OF ALL RESPONSES TO PHQ9 QUESTIONS 1 & 2: 3
6. FEELING BAD ABOUT YOURSELF - OR THAT YOU ARE A FAILURE OR HAVE LET YOURSELF OR YOUR FAMILY DOWN: MORE THAN HALF THE DAYS
7. TROUBLE CONCENTRATING ON THINGS, SUCH AS READING THE NEWSPAPER OR WATCHING TELEVISION: NEARLY EVERY DAY

## 2025-08-29 ENCOUNTER — HOSPITAL ENCOUNTER (OUTPATIENT)
Dept: RADIOLOGY | Facility: HOSPITAL | Age: 55
Discharge: HOME | End: 2025-08-29
Payer: COMMERCIAL

## 2025-08-29 DIAGNOSIS — R10.11 RUQ PAIN: ICD-10-CM

## 2025-08-29 PROCEDURE — 74019 RADEX ABDOMEN 2 VIEWS: CPT

## 2025-08-29 PROCEDURE — 76705 ECHO EXAM OF ABDOMEN: CPT

## 2025-08-30 LAB
ALBUMIN SERPL-MCNC: 4.3 G/DL (ref 3.6–5.1)
ALP SERPL-CCNC: 45 U/L (ref 37–153)
ALT SERPL-CCNC: 21 U/L (ref 6–29)
AMYLASE SERPL-CCNC: 43 U/L (ref 21–101)
ANION GAP SERPL CALCULATED.4IONS-SCNC: 11 MMOL/L (CALC) (ref 7–17)
AST SERPL-CCNC: 21 U/L (ref 10–35)
BASOPHILS # BLD AUTO: 85 CELLS/UL (ref 0–200)
BASOPHILS NFR BLD AUTO: 0.9 %
BILIRUB SERPL-MCNC: 0.2 MG/DL (ref 0.2–1.2)
BUN SERPL-MCNC: 16 MG/DL (ref 7–25)
CALCIUM SERPL-MCNC: 9.3 MG/DL (ref 8.6–10.4)
CHLORIDE SERPL-SCNC: 101 MMOL/L (ref 98–110)
CO2 SERPL-SCNC: 29 MMOL/L (ref 20–32)
CREAT SERPL-MCNC: 0.82 MG/DL (ref 0.5–1.03)
EGFRCR SERPLBLD CKD-EPI 2021: 85 ML/MIN/1.73M2
EOSINOPHIL # BLD AUTO: 188 CELLS/UL (ref 15–500)
EOSINOPHIL NFR BLD AUTO: 2 %
ERYTHROCYTE [DISTWIDTH] IN BLOOD BY AUTOMATED COUNT: 14.2 % (ref 11–15)
GLUCOSE SERPL-MCNC: 101 MG/DL (ref 65–139)
HCT VFR BLD AUTO: 42.4 % (ref 35–45)
HGB BLD-MCNC: 13.8 G/DL (ref 11.7–15.5)
LIPASE SERPL-CCNC: 27 U/L (ref 7–60)
LYMPHOCYTES # BLD AUTO: 3271 CELLS/UL (ref 850–3900)
LYMPHOCYTES NFR BLD AUTO: 34.8 %
MAGNESIUM SERPL-MCNC: 2.2 MG/DL (ref 1.5–2.5)
MCH RBC QN AUTO: 28.8 PG (ref 27–33)
MCHC RBC AUTO-ENTMCNC: 32.5 G/DL (ref 32–36)
MCV RBC AUTO: 88.5 FL (ref 80–100)
MONOCYTES # BLD AUTO: 526 CELLS/UL (ref 200–950)
MONOCYTES NFR BLD AUTO: 5.6 %
NEUTROPHILS # BLD AUTO: 5330 CELLS/UL (ref 1500–7800)
NEUTROPHILS NFR BLD AUTO: 56.7 %
PLATELET # BLD AUTO: 373 THOUSAND/UL (ref 140–400)
PMV BLD REES-ECKER: 10.2 FL (ref 7.5–12.5)
POTASSIUM SERPL-SCNC: 3.5 MMOL/L (ref 3.5–5.3)
PROT SERPL-MCNC: 7.1 G/DL (ref 6.1–8.1)
RBC # BLD AUTO: 4.79 MILLION/UL (ref 3.8–5.1)
SODIUM SERPL-SCNC: 141 MMOL/L (ref 135–146)
WBC # BLD AUTO: 9.4 THOUSAND/UL (ref 3.8–10.8)

## 2025-09-26 ENCOUNTER — APPOINTMENT (OUTPATIENT)
Dept: PRIMARY CARE | Facility: CLINIC | Age: 55
End: 2025-09-26
Payer: COMMERCIAL

## 2025-11-03 ENCOUNTER — APPOINTMENT (OUTPATIENT)
Dept: NEUROLOGY | Facility: CLINIC | Age: 55
End: 2025-11-03
Payer: COMMERCIAL

## 2025-11-10 ENCOUNTER — APPOINTMENT (OUTPATIENT)
Dept: OBSTETRICS AND GYNECOLOGY | Facility: CLINIC | Age: 55
End: 2025-11-10
Payer: COMMERCIAL

## 2025-12-09 ENCOUNTER — APPOINTMENT (OUTPATIENT)
Dept: PRIMARY CARE | Facility: CLINIC | Age: 55
End: 2025-12-09
Payer: COMMERCIAL

## 2026-02-16 ENCOUNTER — APPOINTMENT (OUTPATIENT)
Dept: OBSTETRICS AND GYNECOLOGY | Facility: CLINIC | Age: 56
End: 2026-02-16
Payer: COMMERCIAL